# Patient Record
Sex: FEMALE | Race: WHITE | NOT HISPANIC OR LATINO | Employment: OTHER | URBAN - NONMETROPOLITAN AREA
[De-identification: names, ages, dates, MRNs, and addresses within clinical notes are randomized per-mention and may not be internally consistent; named-entity substitution may affect disease eponyms.]

---

## 2023-03-08 ENCOUNTER — TELEPHONE (OUTPATIENT)
Dept: PRIMARY CARE | Facility: CLINIC | Age: 78
End: 2023-03-08

## 2023-03-08 DIAGNOSIS — F41.9 ANXIETY: Primary | ICD-10-CM

## 2023-03-08 RX ORDER — ALPRAZOLAM 0.25 MG/1
TABLET ORAL
Qty: 60 TABLET | Refills: 2 | Status: SHIPPED | OUTPATIENT
Start: 2023-03-08 | End: 2023-09-15

## 2023-03-08 NOTE — TELEPHONE ENCOUNTER
Pt requesting a refill of Alprazolam 0.25 mg 1 qd. Pt called last week for the refill and because she requested the refill be trans to another pharm the refills were canceled. Pt said in Jan it had 2 refills but [facility] now needs a new rx sent.

## 2023-03-08 NOTE — TELEPHONE ENCOUNTER
"Rx Refill Request Telephone Encounter    Name:  Lorri Velasquez  :  [id number]  Medication Name:  ***  {Dose (Optional):05990::\"***\"}  {Route (Optional):22556::\"***\"}  {Frequency (Optional):94153::\"***\"}  {Quantity (Optional):71183::\"***\"}  {Directions (Optional):95744::\"***\"}  Specific Pharmacy location:  ***  Date of last appointment:  ***  Date of next appointment:  ***  Best number to reach patient:  ***            "

## 2023-03-18 DIAGNOSIS — I10 BENIGN ESSENTIAL HYPERTENSION: ICD-10-CM

## 2023-03-20 PROBLEM — I10 BENIGN ESSENTIAL HYPERTENSION: Status: ACTIVE | Noted: 2023-03-20

## 2023-03-20 RX ORDER — SPIRONOLACTONE 25 MG/1
TABLET ORAL
Qty: 90 TABLET | Refills: 0 | Status: SHIPPED | OUTPATIENT
Start: 2023-03-20 | End: 2023-08-16 | Stop reason: SDUPTHER

## 2023-03-20 RX ORDER — TERAZOSIN 1 MG/1
1 CAPSULE ORAL NIGHTLY
COMMUNITY
End: 2023-03-20 | Stop reason: SDUPTHER

## 2023-03-20 RX ORDER — SPIRONOLACTONE 25 MG/1
25 TABLET ORAL DAILY
COMMUNITY
End: 2023-03-20 | Stop reason: SDUPTHER

## 2023-03-20 RX ORDER — TERAZOSIN 1 MG/1
CAPSULE ORAL
Qty: 90 CAPSULE | Refills: 0 | Status: SHIPPED | OUTPATIENT
Start: 2023-03-20 | End: 2023-07-16

## 2023-04-03 DIAGNOSIS — E78.2 MIXED HYPERLIPIDEMIA: Primary | ICD-10-CM

## 2023-04-03 RX ORDER — ATORVASTATIN CALCIUM 40 MG/1
40 TABLET, FILM COATED ORAL
COMMUNITY
End: 2023-04-03 | Stop reason: SDUPTHER

## 2023-04-03 RX ORDER — ATORVASTATIN CALCIUM 40 MG/1
TABLET, FILM COATED ORAL
Qty: 90 TABLET | Refills: 3 | Status: SHIPPED | OUTPATIENT
Start: 2023-04-03

## 2023-04-13 ENCOUNTER — TELEPHONE (OUTPATIENT)
Dept: PRIMARY CARE | Facility: CLINIC | Age: 78
End: 2023-04-13
Payer: MEDICARE

## 2023-04-13 NOTE — TELEPHONE ENCOUNTER
PT said she fell on her back about 6 wks ago and is now experiencing excruciating pain in the middle of her back on left side, when she takes a deep breath it hurts.  She did not go to the ER at the time of fall.  She is asking to get an x-ray.

## 2023-04-14 DIAGNOSIS — R07.81 RIB PAIN ON LEFT SIDE: Primary | ICD-10-CM

## 2023-04-14 DIAGNOSIS — M54.50 ACUTE LEFT-SIDED LOW BACK PAIN WITHOUT SCIATICA: ICD-10-CM

## 2023-04-14 DIAGNOSIS — M54.6 ACUTE LEFT-SIDED THORACIC BACK PAIN: ICD-10-CM

## 2023-04-14 NOTE — TELEPHONE ENCOUNTER
Would place an order for a mid and low back x-ray, as well as a left rib x-ray series.  She will probably want to get at a [facility] facility closer to her.  If so, I will need to print out the orders, let me know please

## 2023-04-28 PROBLEM — R42 LIGHTHEADEDNESS: Status: ACTIVE | Noted: 2019-03-25

## 2023-04-28 PROBLEM — E11.9 TYPE 2 DIABETES MELLITUS (MULTI): Chronic | Status: ACTIVE | Noted: 2019-03-25

## 2023-04-28 PROBLEM — E28.39 ESTROGEN DEFICIENCY: Status: ACTIVE | Noted: 2023-04-28

## 2023-04-28 PROBLEM — E03.9 HYPOTHYROIDISM: Status: ACTIVE | Noted: 2023-04-28

## 2023-04-28 PROBLEM — E78.5 HYPERLIPIDEMIA: Status: ACTIVE | Noted: 2023-04-28

## 2023-04-28 PROBLEM — M76.60 CALCIFIC ACHILLES TENDINITIS: Status: ACTIVE | Noted: 2023-04-28

## 2023-04-28 PROBLEM — N18.30 CKD (CHRONIC KIDNEY DISEASE) STAGE 3, GFR 30-59 ML/MIN (MULTI): Chronic | Status: ACTIVE | Noted: 2019-03-25

## 2023-04-28 PROBLEM — I10 HYPERTENSION: Status: ACTIVE | Noted: 2019-03-25

## 2023-04-28 PROBLEM — M79.81 NONTRAUMATIC RECTUS HEMATOMA: Status: ACTIVE | Noted: 2019-03-25

## 2023-04-28 PROBLEM — M54.2 CERVICAL PAIN (NECK): Status: ACTIVE | Noted: 2019-03-26

## 2023-04-28 PROBLEM — S30.1XXA HEMATOMA OF RECTUS SHEATH: Status: ACTIVE | Noted: 2019-08-14

## 2023-04-28 PROBLEM — F41.1 GENERALIZED ANXIETY DISORDER: Status: ACTIVE | Noted: 2023-04-28

## 2023-04-28 PROBLEM — R06.09 DYSPNEA ON EXERTION: Status: ACTIVE | Noted: 2023-04-28

## 2023-04-28 PROBLEM — S06.0X1A CONCUSSION WTH LOSS OF CONSCIOUSNESS OF 30 MINUTES OR LESS: Status: ACTIVE | Noted: 2019-08-15

## 2023-04-28 PROBLEM — J01.90 ACUTE SINUSITIS: Status: ACTIVE | Noted: 2023-04-28

## 2023-04-28 PROBLEM — D64.9 ANEMIA: Status: ACTIVE | Noted: 2019-12-25

## 2023-04-28 PROBLEM — G31.84 MILD COGNITIVE IMPAIRMENT: Status: ACTIVE | Noted: 2023-04-28

## 2023-04-28 PROBLEM — F41.9 ANXIETY: Status: ACTIVE | Noted: 2023-04-28

## 2023-04-28 PROBLEM — R53.81 DEBILITY: Status: ACTIVE | Noted: 2019-12-25

## 2023-04-28 PROBLEM — G25.0 TREMOR, ESSENTIAL: Status: ACTIVE | Noted: 2023-04-28

## 2023-04-28 PROBLEM — I49.5 SICK SINUS SYNDROME (MULTI): Status: ACTIVE | Noted: 2018-01-31

## 2023-04-28 PROBLEM — E78.00 HIGH CHOLESTEROL: Status: ACTIVE | Noted: 2023-04-28

## 2023-04-28 PROBLEM — M62.08 RECTUS DIASTASIS: Status: ACTIVE | Noted: 2023-04-28

## 2023-04-28 PROBLEM — S92.352A CLOSED DISPLACED FRACTURE OF FIFTH METATARSAL BONE OF LEFT FOOT: Status: ACTIVE | Noted: 2023-04-28

## 2023-04-28 PROBLEM — I48.91 ATRIAL FIBRILLATION (MULTI): Status: ACTIVE | Noted: 2019-03-25

## 2023-04-28 PROBLEM — K21.9 GERD (GASTROESOPHAGEAL REFLUX DISEASE): Status: ACTIVE | Noted: 2023-04-28

## 2023-04-28 PROBLEM — I25.10 ARTERIOSCLEROSIS OF CORONARY ARTERY: Status: ACTIVE | Noted: 2019-03-25

## 2023-04-28 PROBLEM — J20.9 ACUTE BRONCHITIS: Status: ACTIVE | Noted: 2023-04-28

## 2023-04-28 PROBLEM — M65.28 CALCIFIC ACHILLES TENDINITIS: Status: ACTIVE | Noted: 2023-04-28

## 2023-04-28 PROBLEM — M79.671 PAIN OF RIGHT HEEL: Status: ACTIVE | Noted: 2023-04-28

## 2023-04-28 PROBLEM — D69.6 THROMBOCYTOPENIA (CMS-HCC): Status: ACTIVE | Noted: 2023-04-28

## 2023-04-28 PROBLEM — M25.572 ACUTE LEFT ANKLE PAIN: Status: ACTIVE | Noted: 2023-04-28

## 2023-04-28 PROBLEM — M19.90 ARTHRITIS: Status: ACTIVE | Noted: 2023-04-28

## 2023-04-28 PROBLEM — K80.20 GALL STONES: Status: ACTIVE | Noted: 2023-04-28

## 2023-04-28 PROBLEM — E11.21 DIABETIC NEPHROPATHY (MULTI): Status: ACTIVE | Noted: 2023-04-28

## 2023-04-28 PROBLEM — F32.A DEPRESSION: Status: ACTIVE | Noted: 2023-04-28

## 2023-04-28 PROBLEM — N17.9 AKI (ACUTE KIDNEY INJURY) (CMS-HCC): Status: ACTIVE | Noted: 2019-12-25

## 2023-04-28 PROBLEM — N28.9 FUNCTION KIDNEY DECREASED: Status: ACTIVE | Noted: 2023-04-28

## 2023-04-28 PROBLEM — I95.9 HYPOTENSION: Status: ACTIVE | Noted: 2019-12-24

## 2023-04-28 PROBLEM — S09.90XA CLOSED HEAD INJURY: Status: ACTIVE | Noted: 2019-08-24

## 2023-04-28 PROBLEM — G89.29 CHRONIC THORACIC SPINE PAIN: Status: ACTIVE | Noted: 2023-04-28

## 2023-04-28 PROBLEM — M54.6 CHRONIC THORACIC SPINE PAIN: Status: ACTIVE | Noted: 2023-04-28

## 2023-05-31 ENCOUNTER — TELEPHONE (OUTPATIENT)
Dept: PRIMARY CARE | Facility: CLINIC | Age: 78
End: 2023-05-31

## 2023-05-31 NOTE — TELEPHONE ENCOUNTER
Pt calling having congestion, some color, coughing, sneezing. Started on Sunday. No fever or sob. Poss to call in rx? I offered her an apt and said that she can not come in that she just lost her spouse Mark yesterday or the day before.   Poss rx?

## 2023-06-01 DIAGNOSIS — J40 BRONCHITIS: Primary | ICD-10-CM

## 2023-06-01 RX ORDER — AZITHROMYCIN 250 MG/1
TABLET, FILM COATED ORAL
Qty: 6 TABLET | Refills: 0 | Status: SHIPPED | OUTPATIENT
Start: 2023-06-01 | End: 2023-06-09 | Stop reason: ALTCHOICE

## 2023-06-01 RX ORDER — ALBUTEROL SULFATE 90 UG/1
2 AEROSOL, METERED RESPIRATORY (INHALATION) EVERY 4 HOURS PRN
Qty: 8.5 G | Refills: 0 | Status: SHIPPED | OUTPATIENT
Start: 2023-06-01 | End: 2023-09-27 | Stop reason: ALTCHOICE

## 2023-06-01 NOTE — TELEPHONE ENCOUNTER
The patient called back and asked about medications. She was VERY difficult to understand.  Her cough was wet and productive sounding, she sounded weak and as if it were difficult to talk.  I went and spoke to Dr. Ghosh.  He said he would send in an antibiotic and inhaler.  He stressed that if the patient did not feel well that she needed to go to urgent care to be seen instead.    I explained this all to the patient. She said given the circumstances that she will not go to urgent care.  I explained I was passing the message along, and I understood. I also expressed that while she needed to be at the  home, she should also take care of herself at this time.  The patient will  meds at this time.

## 2023-06-09 ENCOUNTER — LAB (OUTPATIENT)
Dept: LAB | Facility: LAB | Age: 78
End: 2023-06-09
Payer: MEDICARE

## 2023-06-09 ENCOUNTER — OFFICE VISIT (OUTPATIENT)
Dept: PRIMARY CARE | Facility: CLINIC | Age: 78
End: 2023-06-09
Payer: MEDICARE

## 2023-06-09 VITALS
DIASTOLIC BLOOD PRESSURE: 76 MMHG | HEIGHT: 58 IN | SYSTOLIC BLOOD PRESSURE: 136 MMHG | HEART RATE: 62 BPM | OXYGEN SATURATION: 95 % | BODY MASS INDEX: 26.32 KG/M2 | WEIGHT: 125.4 LBS | TEMPERATURE: 97.5 F | RESPIRATION RATE: 14 BRPM

## 2023-06-09 DIAGNOSIS — E11.21 DIABETIC NEPHROPATHY ASSOCIATED WITH TYPE 2 DIABETES MELLITUS (MULTI): ICD-10-CM

## 2023-06-09 DIAGNOSIS — I10 PRIMARY HYPERTENSION: ICD-10-CM

## 2023-06-09 DIAGNOSIS — I48.11 LONGSTANDING PERSISTENT ATRIAL FIBRILLATION (MULTI): ICD-10-CM

## 2023-06-09 DIAGNOSIS — I10 BENIGN ESSENTIAL HYPERTENSION: Primary | ICD-10-CM

## 2023-06-09 DIAGNOSIS — I10 BENIGN ESSENTIAL HYPERTENSION: ICD-10-CM

## 2023-06-09 DIAGNOSIS — F43.21 GRIEF REACTION: ICD-10-CM

## 2023-06-09 PROBLEM — R63.0 ANOREXIA: Status: ACTIVE | Noted: 2019-12-25

## 2023-06-09 PROCEDURE — 36415 COLL VENOUS BLD VENIPUNCTURE: CPT

## 2023-06-09 PROCEDURE — 3075F SYST BP GE 130 - 139MM HG: CPT | Performed by: FAMILY MEDICINE

## 2023-06-09 PROCEDURE — 84443 ASSAY THYROID STIM HORMONE: CPT

## 2023-06-09 PROCEDURE — 99215 OFFICE O/P EST HI 40 MIN: CPT | Performed by: FAMILY MEDICINE

## 2023-06-09 PROCEDURE — 83036 HEMOGLOBIN GLYCOSYLATED A1C: CPT

## 2023-06-09 PROCEDURE — 3078F DIAST BP <80 MM HG: CPT | Performed by: FAMILY MEDICINE

## 2023-06-09 PROCEDURE — 84439 ASSAY OF FREE THYROXINE: CPT

## 2023-06-09 PROCEDURE — 1159F MED LIST DOCD IN RCRD: CPT | Performed by: FAMILY MEDICINE

## 2023-06-09 PROCEDURE — 80061 LIPID PANEL: CPT

## 2023-06-09 PROCEDURE — 85025 COMPLETE CBC W/AUTO DIFF WBC: CPT

## 2023-06-09 PROCEDURE — 80053 COMPREHEN METABOLIC PANEL: CPT

## 2023-06-09 PROCEDURE — 1170F FXNL STATUS ASSESSED: CPT | Performed by: FAMILY MEDICINE

## 2023-06-09 PROCEDURE — 1036F TOBACCO NON-USER: CPT | Performed by: FAMILY MEDICINE

## 2023-06-09 ASSESSMENT — ACTIVITIES OF DAILY LIVING (ADL)
GROCERY_SHOPPING: INDEPENDENT
TAKING_MEDICATION: INDEPENDENT
MANAGING_FINANCES: NEEDS ASSISTANCE
DRESSING: INDEPENDENT
BATHING: INDEPENDENT
DOING_HOUSEWORK: NEEDS ASSISTANCE

## 2023-06-09 ASSESSMENT — PATIENT HEALTH QUESTIONNAIRE - PHQ9
10. IF YOU CHECKED OFF ANY PROBLEMS, HOW DIFFICULT HAVE THESE PROBLEMS MADE IT FOR YOU TO DO YOUR WORK, TAKE CARE OF THINGS AT HOME, OR GET ALONG WITH OTHER PEOPLE: VERY DIFFICULT
2. FEELING DOWN, DEPRESSED OR HOPELESS: NEARLY EVERY DAY
3. TROUBLE FALLING OR STAYING ASLEEP OR SLEEPING TOO MUCH: NEARLY EVERY DAY
9. THOUGHTS THAT YOU WOULD BE BETTER OFF DEAD, OR OF HURTING YOURSELF: NOT AT ALL
4. FEELING TIRED OR HAVING LITTLE ENERGY: SEVERAL DAYS
1. LITTLE INTEREST OR PLEASURE IN DOING THINGS: NEARLY EVERY DAY
7. TROUBLE CONCENTRATING ON THINGS, SUCH AS READING THE NEWSPAPER OR WATCHING TELEVISION: SEVERAL DAYS
6. FEELING BAD ABOUT YOURSELF - OR THAT YOU ARE A FAILURE OR HAVE LET YOURSELF OR YOUR FAMILY DOWN: NOT AT ALL
5. POOR APPETITE OR OVEREATING: SEVERAL DAYS
SUM OF ALL RESPONSES TO PHQ QUESTIONS 1-9: 12
8. MOVING OR SPEAKING SO SLOWLY THAT OTHER PEOPLE COULD HAVE NOTICED. OR THE OPPOSITE, BEING SO FIGETY OR RESTLESS THAT YOU HAVE BEEN MOVING AROUND A LOT MORE THAN USUAL: NOT AT ALL
SUM OF ALL RESPONSES TO PHQ9 QUESTIONS 1 AND 2: 6

## 2023-06-09 ASSESSMENT — PAIN SCALES - GENERAL: PAINLEVEL: 0-NO PAIN

## 2023-06-09 ASSESSMENT — ENCOUNTER SYMPTOMS
DEPRESSION: 1
LOSS OF SENSATION IN FEET: 0
OCCASIONAL FEELINGS OF UNSTEADINESS: 1

## 2023-06-09 NOTE — PROGRESS NOTES
Subjective   Reason for Visit: Lorri Velasquez is an 78 y.o. female here for a Medicare Wellness visit.          Reviewed all medications by prescribing practitioner or clinical pharmacist (such as prescriptions, OTCs, herbal therapies and supplements) and documented in the medical record.    HPI    Patient Care Team:  Bayron Ghosh MD as PCP - General  Bayron Ghosh MD as PCP - [facility] PCP     Review of Systems    Objective   Vitals:  Breastfeeding Unknown       Physical Exam    Assessment/Plan   Problem List Items Addressed This Visit    None

## 2023-06-09 NOTE — PROGRESS NOTES
"Subjective   Patient ID: Lorri Velasquez is a 78 y.o. female who presents for Medicare Annual Wellness Visit Subsequent and Fall (Tripping over her own feet head feels funny turns around and falls).    [facility]   Isabel was seen today for a routine follow-up of her medications, including those for hypertension, impaired glucose tolerance, hyperlipidemia, and others.  She is fasting for blood work today.  Unfortunately, her spouse Mark just  about a week ago, from severe Parkinson's disease, also coronary artery disease and CHF.  He  at home, peacefully.  She has had some difficulty sleeping, feels a bit \"numb\".  She does live with her son Casey, who is supportive.  She is now on Eliquis 2.5 mg just once daily, per cardiology, for her atrial fibrillation.  She is concerned because she has stumbled, fell recently tripping on a step at a local park.  Mysoline, which was started for an essential tremor, does seem to make her drowsy.  She takes a low-dose, just as needed.  She is considering stopping it altogether.  She is also taking alprazolam, generally 1 tablet daily for many years.   Previous labs from early February of this year reviewed  Review of Systems  The full, 10+ multi-organ review of systems, is within normal limits with the exception of what is noted above in HPI.  Objective   /76 (BP Location: Right arm, Patient Position: Sitting, BP Cuff Size: Adult)   Pulse 62   Temp 36.4 °C (97.5 °F) (Temporal)   Resp 14   Ht 1.473 m (4' 10\")   Wt 56.9 kg (125 lb 6.4 oz)   SpO2 95%   Breastfeeding Unknown   BMI 26.21 kg/m²     Physical Exam  .  Cardiac exam reveals an irregularly irregular rhythm, regular rate, murmur noted.  Lungs are clear, no lower extremity edema present  Constitutional/General appearance: alert, oriented, well-appearing, in no distress  Head and face exam is normal  No scleral icterus or conjunctival erythema present  Hearing is grossly normal  Respiratory effort is normal, " no dyspnea noted  Cortical function is normal  Mood, affect, are pleasant, appropriate, and interactive.  Insight is normal    Assessment/Plan     Atrial fibrillation, low-dose anticoagulation, rate controlled, managed by cardiology.    She will continue all medications for now, though we discussed minimal lysing Mysoline, and weaning off of alprazolam, beginning after her 's  service in a couple of weeks.    Regarding her thyroid, cholesterol, blood pressure, she will continue all routine medications, fasting labs will be checked today.    I reviewed, discussed in detail, counseled a bit regarding the loss of her  Mark.  She will call if she needs anything.      Total time spent with patient, reviewing records, and completing charting was 45 minutes, over half of it spent counseling and/or coordinating care  **Portions of this medical record have been created using voice recognition software and may have minor errors which are inherent in voice recognition systems. It has not been fully edited for typographical or grammatical errors**

## 2023-06-10 LAB
ALANINE AMINOTRANSFERASE (SGPT) (U/L) IN SER/PLAS: 21 U/L (ref 7–45)
ALBUMIN (G/DL) IN SER/PLAS: 4.1 G/DL (ref 3.4–5)
ALKALINE PHOSPHATASE (U/L) IN SER/PLAS: 72 U/L (ref 33–136)
ANION GAP IN SER/PLAS: 16 MMOL/L (ref 10–20)
ASPARTATE AMINOTRANSFERASE (SGOT) (U/L) IN SER/PLAS: 29 U/L (ref 9–39)
BASOPHILS (10*3/UL) IN BLOOD BY AUTOMATED COUNT: 0.06 X10E9/L (ref 0–0.1)
BASOPHILS/100 LEUKOCYTES IN BLOOD BY AUTOMATED COUNT: 0.7 % (ref 0–2)
BILIRUBIN TOTAL (MG/DL) IN SER/PLAS: 0.7 MG/DL (ref 0–1.2)
CALCIUM (MG/DL) IN SER/PLAS: 9.6 MG/DL (ref 8.6–10.6)
CARBON DIOXIDE, TOTAL (MMOL/L) IN SER/PLAS: 25 MMOL/L (ref 21–32)
CHLORIDE (MMOL/L) IN SER/PLAS: 102 MMOL/L (ref 98–107)
CHOLESTEROL (MG/DL) IN SER/PLAS: 86 MG/DL (ref 0–199)
CHOLESTEROL IN HDL (MG/DL) IN SER/PLAS: 31 MG/DL
CHOLESTEROL/HDL RATIO: 2.8
CREATININE (MG/DL) IN SER/PLAS: 2 MG/DL (ref 0.5–1.05)
EOSINOPHILS (10*3/UL) IN BLOOD BY AUTOMATED COUNT: 0.09 X10E9/L (ref 0–0.4)
EOSINOPHILS/100 LEUKOCYTES IN BLOOD BY AUTOMATED COUNT: 1.1 % (ref 0–6)
ERYTHROCYTE DISTRIBUTION WIDTH (RATIO) BY AUTOMATED COUNT: 14.8 % (ref 11.5–14.5)
ERYTHROCYTE MEAN CORPUSCULAR HEMOGLOBIN CONCENTRATION (G/DL) BY AUTOMATED: 30.8 G/DL (ref 32–36)
ERYTHROCYTE MEAN CORPUSCULAR VOLUME (FL) BY AUTOMATED COUNT: 93 FL (ref 80–100)
ERYTHROCYTES (10*6/UL) IN BLOOD BY AUTOMATED COUNT: 4.45 X10E12/L (ref 4–5.2)
ESTIMATED AVERAGE GLUCOSE FOR HBA1C: 166 MG/DL
GFR FEMALE: 25 ML/MIN/1.73M2
GLUCOSE (MG/DL) IN SER/PLAS: 161 MG/DL (ref 74–99)
HEMATOCRIT (%) IN BLOOD BY AUTOMATED COUNT: 41.3 % (ref 36–46)
HEMOGLOBIN (G/DL) IN BLOOD: 12.7 G/DL (ref 12–16)
HEMOGLOBIN A1C/HEMOGLOBIN TOTAL IN BLOOD: 7.4 %
IMMATURE GRANULOCYTES/100 LEUKOCYTES IN BLOOD BY AUTOMATED COUNT: 1.1 % (ref 0–0.9)
LDL: 33 MG/DL (ref 0–99)
LEUKOCYTES (10*3/UL) IN BLOOD BY AUTOMATED COUNT: 8.2 X10E9/L (ref 4.4–11.3)
LYMPHOCYTES (10*3/UL) IN BLOOD BY AUTOMATED COUNT: 1.23 X10E9/L (ref 0.8–3)
LYMPHOCYTES/100 LEUKOCYTES IN BLOOD BY AUTOMATED COUNT: 15 % (ref 13–44)
MONOCYTES (10*3/UL) IN BLOOD BY AUTOMATED COUNT: 0.68 X10E9/L (ref 0.05–0.8)
MONOCYTES/100 LEUKOCYTES IN BLOOD BY AUTOMATED COUNT: 8.3 % (ref 2–10)
NEUTROPHILS (10*3/UL) IN BLOOD BY AUTOMATED COUNT: 6.05 X10E9/L (ref 1.6–5.5)
NEUTROPHILS/100 LEUKOCYTES IN BLOOD BY AUTOMATED COUNT: 73.8 % (ref 40–80)
NRBC (PER 100 WBCS) BY AUTOMATED COUNT: 0 /100 WBC (ref 0–0)
PLATELETS (10*3/UL) IN BLOOD AUTOMATED COUNT: 241 X10E9/L (ref 150–450)
POTASSIUM (MMOL/L) IN SER/PLAS: 4.9 MMOL/L (ref 3.5–5.3)
PROTEIN TOTAL: 7.1 G/DL (ref 6.4–8.2)
SODIUM (MMOL/L) IN SER/PLAS: 138 MMOL/L (ref 136–145)
THYROTROPIN (MIU/L) IN SER/PLAS BY DETECTION LIMIT <= 0.05 MIU/L: 0.25 MIU/L (ref 0.44–3.98)
THYROXINE (T4) FREE (NG/DL) IN SER/PLAS: 1.9 NG/DL (ref 0.78–1.48)
TRIGLYCERIDE (MG/DL) IN SER/PLAS: 109 MG/DL (ref 0–149)
UREA NITROGEN (MG/DL) IN SER/PLAS: 30 MG/DL (ref 6–23)
VLDL: 22 MG/DL (ref 0–40)

## 2023-06-15 DIAGNOSIS — E03.9 ACQUIRED HYPOTHYROIDISM: Primary | ICD-10-CM

## 2023-06-15 DIAGNOSIS — N18.31 STAGE 3A CHRONIC KIDNEY DISEASE (MULTI): Chronic | ICD-10-CM

## 2023-06-15 RX ORDER — LEVOTHYROXINE SODIUM 100 UG/1
100 TABLET ORAL DAILY
Qty: 90 TABLET | Refills: 1 | Status: SHIPPED | OUTPATIENT
Start: 2023-06-15 | End: 2023-08-16 | Stop reason: SDUPTHER

## 2023-06-15 NOTE — RESULT ENCOUNTER NOTE
Labs are overall stable, incl chol, sugar.  Kidney function is a little worse, be sure to drink 64 oz of water daily  Thyroid dose needs decreased a little, to 100 mcg daily, new Rx sent  Recheck thyroid and kidney labs in 6 months

## 2023-07-14 DIAGNOSIS — I10 BENIGN ESSENTIAL HYPERTENSION: ICD-10-CM

## 2023-07-16 RX ORDER — TERAZOSIN 1 MG/1
CAPSULE ORAL
Qty: 90 CAPSULE | Refills: 3 | Status: SHIPPED | OUTPATIENT
Start: 2023-07-16

## 2023-07-23 DIAGNOSIS — F34.1 PERSISTENT DEPRESSIVE DISORDER: Primary | ICD-10-CM

## 2023-07-23 DIAGNOSIS — F41.9 ANXIETY: ICD-10-CM

## 2023-07-24 RX ORDER — ESCITALOPRAM OXALATE 20 MG/1
20 TABLET ORAL DAILY
Qty: 90 TABLET | Refills: 3 | Status: SHIPPED | OUTPATIENT
Start: 2023-07-24 | End: 2023-08-16 | Stop reason: SDUPTHER

## 2023-08-16 ENCOUNTER — TELEPHONE (OUTPATIENT)
Dept: PRIMARY CARE | Facility: CLINIC | Age: 78
End: 2023-08-16

## 2023-08-16 DIAGNOSIS — F34.1 PERSISTENT DEPRESSIVE DISORDER: ICD-10-CM

## 2023-08-16 DIAGNOSIS — K21.00 GASTROESOPHAGEAL REFLUX DISEASE WITH ESOPHAGITIS WITHOUT HEMORRHAGE: ICD-10-CM

## 2023-08-16 DIAGNOSIS — E03.9 ACQUIRED HYPOTHYROIDISM: ICD-10-CM

## 2023-08-16 DIAGNOSIS — I10 BENIGN ESSENTIAL HYPERTENSION: ICD-10-CM

## 2023-08-16 DIAGNOSIS — E11.00 TYPE 2 DIABETES MELLITUS WITH HYPEROSMOLARITY WITHOUT COMA, WITHOUT LONG-TERM CURRENT USE OF INSULIN (MULTI): Chronic | ICD-10-CM

## 2023-08-16 DIAGNOSIS — F41.9 ANXIETY: ICD-10-CM

## 2023-08-16 PROBLEM — I48.4 ATYPICAL ATRIAL FLUTTER (MULTI): Status: ACTIVE | Noted: 2023-08-16

## 2023-08-16 RX ORDER — ESCITALOPRAM OXALATE 20 MG/1
20 TABLET ORAL DAILY
Qty: 90 TABLET | Refills: 3 | Status: SHIPPED | OUTPATIENT
Start: 2023-08-16

## 2023-08-16 RX ORDER — SPIRONOLACTONE 25 MG/1
25 TABLET ORAL DAILY
Qty: 90 TABLET | Refills: 3 | Status: SHIPPED | OUTPATIENT
Start: 2023-08-16

## 2023-08-16 RX ORDER — LEVOTHYROXINE SODIUM 100 UG/1
100 TABLET ORAL DAILY
Qty: 90 TABLET | Refills: 3 | Status: SHIPPED | OUTPATIENT
Start: 2023-08-16 | End: 2023-09-28 | Stop reason: ALTCHOICE

## 2023-08-16 RX ORDER — PANTOPRAZOLE SODIUM 40 MG/1
40 TABLET, DELAYED RELEASE ORAL DAILY
Qty: 90 TABLET | Refills: 3 | Status: SHIPPED | OUTPATIENT
Start: 2023-08-16

## 2023-08-16 RX ORDER — GLIMEPIRIDE 1 MG/1
1 TABLET ORAL DAILY
Qty: 90 TABLET | Refills: 3 | Status: SHIPPED | OUTPATIENT
Start: 2023-08-16

## 2023-08-16 NOTE — TELEPHONE ENCOUNTER
The patient is asking Dr. Ghosh's nurse to call her in regards to her medications and questions she about those.

## 2023-08-16 NOTE — TELEPHONE ENCOUNTER
Spoke to Lorri she is concerned about her medications and confused what she is supposed to be taking. Went over all her meds and found out which ones she needs refills on please send to her local pharmacy.

## 2023-08-17 NOTE — TELEPHONE ENCOUNTER
Spoke with the patient this morning and provided information. She did not have any further questions

## 2023-09-15 DIAGNOSIS — F41.9 ANXIETY: ICD-10-CM

## 2023-09-15 RX ORDER — ALPRAZOLAM 0.25 MG/1
TABLET ORAL
Qty: 60 TABLET | Refills: 0 | Status: SHIPPED | OUTPATIENT
Start: 2023-09-15 | End: 2023-09-27

## 2023-09-27 ENCOUNTER — LAB (OUTPATIENT)
Dept: LAB | Facility: LAB | Age: 78
End: 2023-09-27
Payer: MEDICARE

## 2023-09-27 ENCOUNTER — OFFICE VISIT (OUTPATIENT)
Dept: PRIMARY CARE | Facility: CLINIC | Age: 78
End: 2023-09-27
Payer: MEDICARE

## 2023-09-27 VITALS
TEMPERATURE: 97.5 F | HEART RATE: 67 BPM | WEIGHT: 131.8 LBS | SYSTOLIC BLOOD PRESSURE: 139 MMHG | RESPIRATION RATE: 16 BRPM | OXYGEN SATURATION: 95 % | DIASTOLIC BLOOD PRESSURE: 64 MMHG | BODY MASS INDEX: 27.55 KG/M2

## 2023-09-27 DIAGNOSIS — F41.9 ANXIETY: ICD-10-CM

## 2023-09-27 DIAGNOSIS — E03.9 ACQUIRED HYPOTHYROIDISM: ICD-10-CM

## 2023-09-27 DIAGNOSIS — E11.00 TYPE 2 DIABETES MELLITUS WITH HYPEROSMOLARITY WITHOUT COMA, WITHOUT LONG-TERM CURRENT USE OF INSULIN (MULTI): ICD-10-CM

## 2023-09-27 DIAGNOSIS — E03.9 ACQUIRED HYPOTHYROIDISM: Primary | ICD-10-CM

## 2023-09-27 DIAGNOSIS — I48.11 LONGSTANDING PERSISTENT ATRIAL FIBRILLATION (MULTI): ICD-10-CM

## 2023-09-27 DIAGNOSIS — E11.21 DIABETIC NEPHROPATHY ASSOCIATED WITH TYPE 2 DIABETES MELLITUS (MULTI): ICD-10-CM

## 2023-09-27 DIAGNOSIS — I10 PRIMARY HYPERTENSION: ICD-10-CM

## 2023-09-27 PROCEDURE — 3075F SYST BP GE 130 - 139MM HG: CPT | Performed by: FAMILY MEDICINE

## 2023-09-27 PROCEDURE — 84443 ASSAY THYROID STIM HORMONE: CPT

## 2023-09-27 PROCEDURE — 1160F RVW MEDS BY RX/DR IN RCRD: CPT | Performed by: FAMILY MEDICINE

## 2023-09-27 PROCEDURE — G0008 ADMIN INFLUENZA VIRUS VAC: HCPCS | Performed by: FAMILY MEDICINE

## 2023-09-27 PROCEDURE — 1126F AMNT PAIN NOTED NONE PRSNT: CPT | Performed by: FAMILY MEDICINE

## 2023-09-27 PROCEDURE — 84439 ASSAY OF FREE THYROXINE: CPT

## 2023-09-27 PROCEDURE — 1036F TOBACCO NON-USER: CPT | Performed by: FAMILY MEDICINE

## 2023-09-27 PROCEDURE — 1159F MED LIST DOCD IN RCRD: CPT | Performed by: FAMILY MEDICINE

## 2023-09-27 PROCEDURE — 99215 OFFICE O/P EST HI 40 MIN: CPT | Performed by: FAMILY MEDICINE

## 2023-09-27 PROCEDURE — 3078F DIAST BP <80 MM HG: CPT | Performed by: FAMILY MEDICINE

## 2023-09-27 PROCEDURE — 90662 IIV NO PRSV INCREASED AG IM: CPT | Performed by: FAMILY MEDICINE

## 2023-09-27 PROCEDURE — 36415 COLL VENOUS BLD VENIPUNCTURE: CPT

## 2023-09-27 RX ORDER — BUSPIRONE HYDROCHLORIDE 5 MG/1
5 TABLET ORAL 2 TIMES DAILY
Qty: 60 TABLET | Refills: 5 | Status: SHIPPED | OUTPATIENT
Start: 2023-09-27 | End: 2023-10-20 | Stop reason: SINTOL

## 2023-09-27 RX ORDER — ALPRAZOLAM 0.5 MG/1
0.5 TABLET ORAL 3 TIMES DAILY PRN
Qty: 30 TABLET | Refills: 0 | Status: SHIPPED | OUTPATIENT
Start: 2023-09-27 | End: 2023-11-08

## 2023-09-27 ASSESSMENT — ANXIETY QUESTIONNAIRES
GAD7 TOTAL SCORE: 18
6. BECOMING EASILY ANNOYED OR IRRITABLE: NEARLY EVERY DAY
7. FEELING AFRAID AS IF SOMETHING AWFUL MIGHT HAPPEN: NOT AT ALL
3. WORRYING TOO MUCH ABOUT DIFFERENT THINGS: NEARLY EVERY DAY
1. FEELING NERVOUS, ANXIOUS, OR ON EDGE: NEARLY EVERY DAY
IF YOU CHECKED OFF ANY PROBLEMS ON THIS QUESTIONNAIRE, HOW DIFFICULT HAVE THESE PROBLEMS MADE IT FOR YOU TO DO YOUR WORK, TAKE CARE OF THINGS AT HOME, OR GET ALONG WITH OTHER PEOPLE: EXTREMELY DIFFICULT
5. BEING SO RESTLESS THAT IT IS HARD TO SIT STILL: NEARLY EVERY DAY
4. TROUBLE RELAXING: NEARLY EVERY DAY
2. NOT BEING ABLE TO STOP OR CONTROL WORRYING: NEARLY EVERY DAY

## 2023-09-27 ASSESSMENT — PATIENT HEALTH QUESTIONNAIRE - PHQ9
2. FEELING DOWN, DEPRESSED OR HOPELESS: NOT AT ALL
SUM OF ALL RESPONSES TO PHQ9 QUESTIONS 1 AND 2: 0
1. LITTLE INTEREST OR PLEASURE IN DOING THINGS: NOT AT ALL

## 2023-09-27 ASSESSMENT — PAIN SCALES - GENERAL: PAINLEVEL: 0-NO PAIN

## 2023-09-27 NOTE — PATIENT INSTRUCTIONS
Add Buspirone 5 mg twice daily every day  Continue lexapro (escitalopram) 20mg every day  TRY alprazolam (xanax) 0.5mg twice daily  Stop primidone  Eliquis (apixaban) 2.5 mg TWICE daily

## 2023-09-28 DIAGNOSIS — E03.9 ACQUIRED HYPOTHYROIDISM: Primary | ICD-10-CM

## 2023-09-28 LAB
THYROTROPIN (MIU/L) IN SER/PLAS BY DETECTION LIMIT <= 0.05 MIU/L: 0.37 MIU/L (ref 0.44–3.98)
THYROXINE (T4) FREE (NG/DL) IN SER/PLAS: 1.73 NG/DL (ref 0.78–1.48)

## 2023-09-28 RX ORDER — LEVOTHYROXINE SODIUM 88 UG/1
88 TABLET ORAL DAILY
Qty: 30 TABLET | Refills: 1 | Status: SHIPPED | OUTPATIENT
Start: 2023-09-28 | End: 2023-11-25

## 2023-09-28 NOTE — RESULT ENCOUNTER NOTE
Based on her labs, thyroid dose needs decreased a bit more.  I sent a prescription for 88 mcg to her [facility].  Take this daily on an empty stomach, need to recheck blood test before the second refill is finished, i.e. in about 7 weeks.  Order placed

## 2023-10-02 NOTE — PROGRESS NOTES
Subjective   Patient ID: Lorri Velasquez is a 78 y.o. female who presents for Hyperlipidemia, Hypertension, Hypothyroidism, Diabetes, Depression, and Shortness of Breath.    HPI   Ronna was seen today, son present, for a routine follow-up of her medications, including those for depression, anxiety, tremor, thyroid, cholesterol, atrial fibrillation.  She does not believe primidone has helped her tremor much, and she does note drowsiness from it.  Tremor is present much of the day, seems worse with anxiety.  At last TSH check, her levothyroxine dose was reduced, she is due for recheck today.  Her cardiology care is up-to-date.  She does note subjective dyspnea, often at rest, particularly if she is upset or anxious.  These labs were reviewed, she would like a flu vaccine.  Review of Systems  The full, 10+ multi-organ review of systems, is within normal limits with the exception of what is noted above in HPI.  Objective   /64 (BP Location: Right arm, Patient Position: Sitting, BP Cuff Size: Adult)   Pulse 67   Temp 36.4 °C (97.5 °F) (Temporal)   Resp 16   Wt 59.8 kg (131 lb 12.8 oz)   SpO2 95%   BMI 27.55 kg/m²     Physical Exam  Cardiac exam reveals an irregularly irregular rhythm, regular rate, murmur present.   Lungs are clear bilaterally.    No lower extremity edema present.  She is pleasant, appropriate, interactive, though anxious at times.  The more anxious she appears, the worse her upper extremity/hand tremors appear to be    Assessment/Plan     Hypertension, atrial fibrillation, diabetes, are all fairly well controlled.  Continue current medication    Tremor, which I now feel significantly correlates to her anxiety level.  We will stay off of primidone, continue full dose Lexapro, add buspirone 5 mg twice daily.  She has taken alprazolam fairly regularly for over 15 years, tolerates it well.  Risks, including cognitive impairment, drowsiness, slowed reflexes, sedation all again reviewed.   I recommend that at least in the short-term she take alprazolam twice daily, to see if tremor and anxiety will improve.  My hope is that if BuSpar helps (may need increased), that alprazolam can be decreased.  We will check on her in about a month, to review her progress.    Continue all other medications    Flu vaccine given  Follow-up as scheduled      Total time spent with patient, reviewing records, and completing charting was 40 minutes, over half of it spent counseling and/or coordinating care  **Portions of this medical record have been created using voice recognition software and may have minor errors which are inherent in voice recognition systems. It has not been fully edited for typographical or grammatical errors**

## 2023-10-16 ENCOUNTER — TELEPHONE (OUTPATIENT)
Dept: PRIMARY CARE | Facility: CLINIC | Age: 78
End: 2023-10-16

## 2023-10-16 NOTE — TELEPHONE ENCOUNTER
Patient called in to ask about the changes that were made,   She stated that she is also taking Xeralto and she is also taking the Eliquis.       She is taking half of the xanax in the morning.     Since the medication changes she is having worse anxiety and feeling worse, having a hard time walking a cross the room, lots shaking and shortness of breath.     She is not sure why xeralto was taken off the list

## 2023-10-16 NOTE — TELEPHONE ENCOUNTER
"Call \"Ronna\", STOP the xarelto, she should ONLY be on Eliquis.    Have her STOP the buspirone, call with an update on Friday.  Have her write down these instructions please  "

## 2023-10-17 NOTE — TELEPHONE ENCOUNTER
The patient was given information in detail. She wrote the information down and will call with an update on Friday.

## 2023-10-20 DIAGNOSIS — I48.11 LONGSTANDING PERSISTENT ATRIAL FIBRILLATION (MULTI): Primary | ICD-10-CM

## 2023-10-20 NOTE — TELEPHONE ENCOUNTER
Patient calling in with an update as instructed:    She did stop taking buspar and xarelto prescriptions    She feels a little better but is still feeling lightheaded, achey legs that feel heavy (feels better mid day), BP is 76/48 HR 62, when she walks even 8 feet she feels as though she may collapse     She is drinking lots of water also

## 2023-10-20 NOTE — TELEPHONE ENCOUNTER
Spoke to patient advised and understood. She will call Monday with an update on how she is feeling

## 2023-10-20 NOTE — TELEPHONE ENCOUNTER
Have her not take her amiodarone for 3 days in a row, call with an update.  If she continues to feel poorly, would recommend an emergency room visit for urgent labs, EKG, evaluation.

## 2023-10-24 ENCOUNTER — TELEPHONE (OUTPATIENT)
Dept: PRIMARY CARE | Facility: CLINIC | Age: 78
End: 2023-10-24

## 2023-10-24 DIAGNOSIS — I10 HYPERTENSION, UNSPECIFIED TYPE: ICD-10-CM

## 2023-10-24 RX ORDER — AMIODARONE HYDROCHLORIDE 100 MG/1
100 TABLET ORAL DAILY
Qty: 90 TABLET | Refills: 3 | Status: SHIPPED | OUTPATIENT
Start: 2023-10-24 | End: 2024-10-23

## 2023-10-24 NOTE — TELEPHONE ENCOUNTER
Patient calling with update since amiodarone was discontinued Friday-yesterday    She feels a lot better since she has not taken this, she is still out of breath but not as bad, dizziness is much better.    Is there something else she can try for afib?

## 2023-10-24 NOTE — TELEPHONE ENCOUNTER
Lets try a lower dose for now, 100 mg daily instead of 200 mg amiodarone.  If side effects increase, would recommend she follow-up with cardiology for additional advice

## 2023-10-25 ENCOUNTER — TELEPHONE (OUTPATIENT)
Dept: PRIMARY CARE | Facility: CLINIC | Age: 78
End: 2023-10-25

## 2023-10-25 RX ORDER — HYDROCHLOROTHIAZIDE 12.5 MG/1
12.5 TABLET ORAL DAILY
Qty: 90 TABLET | Refills: 3 | Status: SHIPPED | OUTPATIENT
Start: 2023-10-25

## 2023-10-25 NOTE — TELEPHONE ENCOUNTER
Yes, sorry, here's where the confusion came from:    This message was a reminder that I placed several weeks ago to check on her to see how her buspirone was working.  We happened to have stopped it shortly after starting it because of side effects, the reminder still popped up planned though.    She should not be taking it, continue medications as was reviewed yesterday.

## 2023-10-25 NOTE — TELEPHONE ENCOUNTER
Left message for patient to call back to discuss     ----- Message from Bayron Ghosh MD sent at 10/2/2023 12:28 PM EDT -----  3 to 4-week reminder, please check to see how her anxiety is, and if the addition of buspirone has helped, and if twice daily alprazolam has been beneficial.  Buspirone can be increased if needed, to 10 mg twice daily.

## 2023-10-25 NOTE — TELEPHONE ENCOUNTER
"Patient states she was taken off buspirone, not sent in a new script?    She is very confused and would like a call back regarding all of her medications    She became frustrated when I asked how she was doing and said \"I went over all of this yesterday\" and hung up the phone   "

## 2023-11-02 DIAGNOSIS — D50.9 HYPOCHROMIC MICROCYTIC ANEMIA: Primary | ICD-10-CM

## 2023-11-02 DIAGNOSIS — N18.30 STAGE 3 CHRONIC KIDNEY DISEASE, UNSPECIFIED WHETHER STAGE 3A OR 3B CKD (MULTI): Chronic | ICD-10-CM

## 2023-11-07 DIAGNOSIS — F41.9 ANXIETY: ICD-10-CM

## 2023-11-08 RX ORDER — ALPRAZOLAM 0.5 MG/1
0.5 TABLET ORAL 3 TIMES DAILY PRN
Qty: 30 TABLET | Refills: 0 | Status: SHIPPED | OUTPATIENT
Start: 2023-11-08

## 2023-11-13 ENCOUNTER — LAB (OUTPATIENT)
Dept: LAB | Facility: LAB | Age: 78
End: 2023-11-13
Payer: MEDICARE

## 2023-11-13 DIAGNOSIS — E03.9 ACQUIRED HYPOTHYROIDISM: ICD-10-CM

## 2023-11-13 DIAGNOSIS — N18.30 STAGE 3 CHRONIC KIDNEY DISEASE, UNSPECIFIED WHETHER STAGE 3A OR 3B CKD (MULTI): Chronic | ICD-10-CM

## 2023-11-13 DIAGNOSIS — D50.9 HYPOCHROMIC MICROCYTIC ANEMIA: ICD-10-CM

## 2023-11-13 LAB
ANION GAP SERPL CALC-SCNC: 14 MMOL/L (ref 10–20)
BASOPHILS # BLD AUTO: 0.05 X10*3/UL (ref 0–0.1)
BASOPHILS NFR BLD AUTO: 0.8 %
BUN SERPL-MCNC: 24 MG/DL (ref 6–23)
CALCIUM SERPL-MCNC: 9 MG/DL (ref 8.6–10.3)
CHLORIDE SERPL-SCNC: 103 MMOL/L (ref 98–107)
CO2 SERPL-SCNC: 25 MMOL/L (ref 21–32)
CREAT SERPL-MCNC: 2.11 MG/DL (ref 0.5–1.05)
EOSINOPHIL # BLD AUTO: 0.1 X10*3/UL (ref 0–0.4)
EOSINOPHIL NFR BLD AUTO: 1.7 %
ERYTHROCYTE [DISTWIDTH] IN BLOOD BY AUTOMATED COUNT: 16.3 % (ref 11.5–14.5)
FERRITIN SERPL-MCNC: 20 NG/ML (ref 8–150)
FOLATE SERPL-MCNC: 18.6 NG/ML
GFR SERPL CREATININE-BSD FRML MDRD: 24 ML/MIN/1.73M*2
GLUCOSE SERPL-MCNC: 166 MG/DL (ref 74–99)
HCT VFR BLD AUTO: 29 % (ref 36–46)
HGB BLD-MCNC: 8.3 G/DL (ref 12–16)
IMM GRANULOCYTES # BLD AUTO: 0.04 X10*3/UL (ref 0–0.5)
IMM GRANULOCYTES NFR BLD AUTO: 0.7 % (ref 0–0.9)
IRON SATN MFR SERPL: ABNORMAL %
IRON SERPL-MCNC: 17 UG/DL (ref 35–150)
LYMPHOCYTES # BLD AUTO: 0.72 X10*3/UL (ref 0.8–3)
LYMPHOCYTES NFR BLD AUTO: 12 %
MCH RBC QN AUTO: 21.2 PG (ref 26–34)
MCHC RBC AUTO-ENTMCNC: 28.6 G/DL (ref 32–36)
MCV RBC AUTO: 74 FL (ref 80–100)
MONOCYTES # BLD AUTO: 0.54 X10*3/UL (ref 0.05–0.8)
MONOCYTES NFR BLD AUTO: 9 %
NEUTROPHILS # BLD AUTO: 4.56 X10*3/UL (ref 1.6–5.5)
NEUTROPHILS NFR BLD AUTO: 75.8 %
NRBC BLD-RTO: 0 /100 WBCS (ref 0–0)
PLATELET # BLD AUTO: 291 X10*3/UL (ref 150–450)
POLYCHROMASIA BLD QL SMEAR: NORMAL
POTASSIUM SERPL-SCNC: 3.8 MMOL/L (ref 3.5–5.3)
RBC # BLD AUTO: 3.92 X10*6/UL (ref 4–5.2)
RBC MORPH BLD: NORMAL
SCHISTOCYTES BLD QL SMEAR: NORMAL
SODIUM SERPL-SCNC: 138 MMOL/L (ref 136–145)
TIBC SERPL-MCNC: ABNORMAL UG/DL
TSH SERPL-ACNC: 1.67 MIU/L (ref 0.44–3.98)
UIBC SERPL-MCNC: >450 UG/DL (ref 110–370)
VIT B12 SERPL-MCNC: 339 PG/ML (ref 211–911)
WBC # BLD AUTO: 6 X10*3/UL (ref 4.4–11.3)

## 2023-11-13 PROCEDURE — 83540 ASSAY OF IRON: CPT

## 2023-11-13 PROCEDURE — 85025 COMPLETE CBC W/AUTO DIFF WBC: CPT

## 2023-11-13 PROCEDURE — 82746 ASSAY OF FOLIC ACID SERUM: CPT

## 2023-11-13 PROCEDURE — 82728 ASSAY OF FERRITIN: CPT

## 2023-11-13 PROCEDURE — 84443 ASSAY THYROID STIM HORMONE: CPT

## 2023-11-13 PROCEDURE — 36415 COLL VENOUS BLD VENIPUNCTURE: CPT

## 2023-11-13 PROCEDURE — 83550 IRON BINDING TEST: CPT

## 2023-11-13 PROCEDURE — 82607 VITAMIN B-12: CPT

## 2023-11-13 PROCEDURE — 80048 BASIC METABOLIC PNL TOTAL CA: CPT

## 2023-11-15 ENCOUNTER — TELEPHONE (OUTPATIENT)
Dept: PRIMARY CARE | Facility: CLINIC | Age: 78
End: 2023-11-15

## 2023-11-15 DIAGNOSIS — D50.9 IRON DEFICIENCY ANEMIA, UNSPECIFIED IRON DEFICIENCY ANEMIA TYPE: Primary | ICD-10-CM

## 2023-11-17 NOTE — TELEPHONE ENCOUNTER
Patient called in, I was unable to speak to her at the time she called in.    I called her back and we reviewed abnormal blood work namely low hemoglobin and hematocrit, low iron.    New onset anemia iron deficient unknown etiology.  Patient has already stopped her blood thinners she has had a colonoscopy within the last 6 months which was normal.  Her primary care doctor has advised her to follow-up with her GI doctor I would agree with this.  In addition I suggest taking over-the-counter iron 325 mg twice a day until further notice and having her blood count checked in 2 wks .  Given her other comorbidities if her hemoglobin were to drop below 7 I would suggest a blood transfusion.  I asked patient to please call us and let us know when Dr. Batista       STAFF:    get  Dr. Batista's contact information , I'd like to talk to him/ his office

## 2023-11-17 NOTE — TELEPHONE ENCOUNTER
Spoke to staff at Dr. Sarmiento's office, and she will send him a message re patient .  States she will find out if he wants to see her or will have a NP in their office see pt.   I will labs for their reference.      STAFF  Please fax the labs I printed -  in my  outbox - [phone]

## 2023-11-18 ENCOUNTER — TELEPHONE (OUTPATIENT)
Dept: PRIMARY CARE | Facility: CLINIC | Age: 78
End: 2023-11-18

## 2023-11-18 NOTE — TELEPHONE ENCOUNTER
Pt called in to leave Dr. Ghosh a message     She said she had her labs done, and was told all labs were reassuring and does not have blood in her stool. Was told to not start her iron until her next labs in two weeks.     She just wanted to let you know  I did pull the [facility] reports into her chart

## 2023-11-20 NOTE — TELEPHONE ENCOUNTER
Patient is aware, she will buy iron supplementation today.    She also has not called Dr. Sarmiento's office to schedule appointment.  I stressed how important this was and she should call today.

## 2023-11-20 NOTE — TELEPHONE ENCOUNTER
All records for GI were sent over (Dr. Sarmiento) and Dr. Sullivan spoke with provider directly    Unsure who told her that her labs were fine since the last message from our office stated the opposite.     I will call patient today to clarify she needs to be seen ASAP there and should be taking two iron tablets daily

## 2023-11-21 ENCOUNTER — TELEPHONE (OUTPATIENT)
Dept: PRIMARY CARE | Facility: CLINIC | Age: 78
End: 2023-11-21

## 2023-11-21 NOTE — TELEPHONE ENCOUNTER
"Have her try OTC \"Slow Fe Iron\" 45 mg twice daily, may be better tolerated, is extended release.  She cannot take it at the same time as her thyroid medication.  If she takes her thyroid medicine in the morning, recommend taking 1 iron tablet at lunch and 1 at dinner.      "

## 2023-11-21 NOTE — TELEPHONE ENCOUNTER
Pt calling said that she received a call yesterday that she needed to take Iron 325 mg 2 tablets 2 times a day.   Pt said gives her bad abd cramps and constipation. Please advise?

## 2023-11-25 DIAGNOSIS — E03.9 ACQUIRED HYPOTHYROIDISM: ICD-10-CM

## 2023-11-25 RX ORDER — LEVOTHYROXINE SODIUM 88 UG/1
88 TABLET ORAL DAILY
Qty: 30 TABLET | Refills: 2 | Status: SHIPPED | OUTPATIENT
Start: 2023-11-25 | End: 2024-05-16

## 2023-11-27 ENCOUNTER — LAB (OUTPATIENT)
Dept: LAB | Facility: LAB | Age: 78
End: 2023-11-27
Payer: MEDICARE

## 2023-11-27 DIAGNOSIS — D64.9 ANEMIA, UNSPECIFIED: Primary | ICD-10-CM

## 2023-11-27 PROCEDURE — 82607 VITAMIN B-12: CPT

## 2023-11-27 PROCEDURE — 83550 IRON BINDING TEST: CPT

## 2023-11-27 PROCEDURE — 83615 LACTATE (LD) (LDH) ENZYME: CPT

## 2023-11-27 PROCEDURE — 83540 ASSAY OF IRON: CPT

## 2023-11-27 PROCEDURE — 83010 ASSAY OF HAPTOGLOBIN QUANT: CPT

## 2023-11-27 PROCEDURE — 82746 ASSAY OF FOLIC ACID SERUM: CPT

## 2023-11-27 PROCEDURE — 85045 AUTOMATED RETICULOCYTE COUNT: CPT

## 2023-11-27 PROCEDURE — 82728 ASSAY OF FERRITIN: CPT

## 2023-11-27 PROCEDURE — 36415 COLL VENOUS BLD VENIPUNCTURE: CPT

## 2023-11-28 LAB
FERRITIN SERPL-MCNC: 19 NG/ML (ref 8–150)
FOLATE SERPL-MCNC: 21 NG/ML
HAPTOGLOB SERPL-MCNC: 157 MG/DL (ref 37–246)
HGB RETIC QN: 23 PG (ref 28–38)
IMMATURE RETIC FRACTION: 25.7 %
IRON SATN MFR SERPL: ABNORMAL %
IRON SERPL-MCNC: 23 UG/DL (ref 35–150)
LDH SERPL L TO P-CCNC: 176 U/L (ref 84–246)
RETICS #: 0.06 X10*6/UL (ref 0.02–0.11)
RETICS/RBC NFR AUTO: 1.5 % (ref 0.5–2)
TIBC SERPL-MCNC: ABNORMAL UG/DL
UIBC SERPL-MCNC: >450 UG/DL (ref 110–370)
VIT B12 SERPL-MCNC: 492 PG/ML (ref 211–911)

## 2023-12-05 ENCOUNTER — TELEPHONE (OUTPATIENT)
Dept: PRIMARY CARE | Facility: CLINIC | Age: 78
End: 2023-12-05

## 2023-12-05 NOTE — TELEPHONE ENCOUNTER
Pt called because she would like to know if she could be restarted on a medication. Pt would like to restart her Mysoline 50 mg. Pt states here tremors are getting worse. Pt wants to know when restarting this medication it could be at a lower dose? Pt uses [facility] in [place]. Pt is requesting a cb. If no answer she said a detailed message can be left.

## 2023-12-11 DIAGNOSIS — D50.9 IRON DEFICIENCY ANEMIA, UNSPECIFIED IRON DEFICIENCY ANEMIA TYPE: ICD-10-CM

## 2023-12-11 DIAGNOSIS — G25.0 TREMOR, ESSENTIAL: Primary | ICD-10-CM

## 2023-12-11 RX ORDER — PRIMIDONE 50 MG/1
TABLET ORAL
Qty: 90 TABLET | Refills: 1 | Status: SHIPPED | OUTPATIENT
Start: 2023-12-11 | End: 2023-12-11 | Stop reason: ENTERED-IN-ERROR

## 2023-12-11 RX ORDER — PRIMIDONE 50 MG/1
25 TABLET ORAL 3 TIMES DAILY
Qty: 45 TABLET | Refills: 1 | Status: SHIPPED | OUTPATIENT
Start: 2023-12-11 | End: 2024-01-12 | Stop reason: WASHOUT

## 2023-12-11 NOTE — TELEPHONE ENCOUNTER
Left detailed message for patient, advised to call the office back with any questions or concerns.

## 2023-12-11 NOTE — TELEPHONE ENCOUNTER
Patient aware of medication    She is asking if you want her to have any labs for her anemia?    She is sorry for sending so many messages

## 2023-12-12 NOTE — TELEPHONE ENCOUNTER
I ordered repeat labs (in our system, not sure if she'll want to get near her/[facility]).  GI's last note said to check labs, so I thought they ordered

## 2023-12-29 ENCOUNTER — TELEPHONE (OUTPATIENT)
Dept: PRIMARY CARE | Facility: CLINIC | Age: 78
End: 2023-12-29

## 2023-12-29 DIAGNOSIS — J40 BRONCHITIS: ICD-10-CM

## 2023-12-29 DIAGNOSIS — R05.1 ACUTE COUGH: Primary | ICD-10-CM

## 2023-12-29 RX ORDER — BENZONATATE 200 MG/1
200 CAPSULE ORAL 3 TIMES DAILY PRN
Qty: 30 CAPSULE | Refills: 1 | Status: SHIPPED | OUTPATIENT
Start: 2023-12-29 | End: 2024-01-12 | Stop reason: WASHOUT

## 2023-12-29 NOTE — TELEPHONE ENCOUNTER
Pt called in and because she has a cough. Pt states she has no other symptoms.Pt is requesting a cough syrup called in. Pt was requesting a message sent to [facility].

## 2023-12-29 NOTE — TELEPHONE ENCOUNTER
Because of her other medications especially alprazolam, I would prefer she take Tessalon Perles, every 8 hours as needed

## 2023-12-31 RX ORDER — ALBUTEROL SULFATE 90 UG/1
2 AEROSOL, METERED RESPIRATORY (INHALATION) EVERY 6 HOURS PRN
Qty: 18 G | Refills: 0 | Status: SHIPPED | OUTPATIENT
Start: 2023-12-31 | End: 2024-01-30 | Stop reason: WASHOUT

## 2023-12-31 RX ORDER — CODEINE PHOSPHATE AND GUAIFENESIN 10; 100 MG/5ML; MG/5ML
5 SOLUTION ORAL EVERY 6 HOURS PRN
Qty: 237 ML | Refills: 0 | Status: SHIPPED | OUTPATIENT
Start: 2023-12-31 | End: 2024-01-30 | Stop reason: ALTCHOICE

## 2023-12-31 RX ORDER — DOXYCYCLINE 100 MG/1
CAPSULE ORAL
Qty: 14 CAPSULE | Refills: 0 | Status: SHIPPED | OUTPATIENT
Start: 2023-12-31 | End: 2023-12-31 | Stop reason: ENTERED-IN-ERROR

## 2023-12-31 NOTE — PROGRESS NOTES
Pt c/o harsh cough for 3-4 days, no fever, CP, SOB.  Some nasal congestion, minimal ST.  Tested covid negative  Likely viral illness, I sent albuterol inhaler and cough syrup  Call or go to ER if chest symptoms worsen significantly

## 2024-01-03 DIAGNOSIS — J40 BRONCHITIS: ICD-10-CM

## 2024-01-03 DIAGNOSIS — R05.1 ACUTE COUGH: ICD-10-CM

## 2024-01-03 NOTE — TELEPHONE ENCOUNTER
"Patient states she has noticed improvement in symptoms since starting codeine-guaifenesin (Robitussin-AC)  mg/5 mL syrup 12/31/23    She is asking for a refill of this, only has enough left for one dose     I did advise her this bottle should have enough mL's in it to last well over 10 days and patient/ did admit perhaps they did not measure correctly    Advised they should be taking as prescribed      She states she is open to any cough syrup you recommend but \"this one works\"      "

## 2024-01-03 NOTE — TELEPHONE ENCOUNTER
(Yes, even if she was taking 10 mL 4 times daily she should have enough for 3-4 more days.  She can finish her current bottle, will need to get by with Mucinex DM or Robitussin DM)

## 2024-01-11 RX ORDER — RIVAROXABAN 15 MG/1
TABLET, FILM COATED ORAL
COMMUNITY
Start: 2023-09-25 | End: 2024-01-30 | Stop reason: ALTCHOICE

## 2024-01-12 ENCOUNTER — OFFICE VISIT (OUTPATIENT)
Dept: PRIMARY CARE | Facility: CLINIC | Age: 79
End: 2024-01-12
Payer: MEDICARE

## 2024-01-12 VITALS
HEART RATE: 62 BPM | TEMPERATURE: 98 F | WEIGHT: 122.8 LBS | SYSTOLIC BLOOD PRESSURE: 133 MMHG | BODY MASS INDEX: 25.67 KG/M2 | DIASTOLIC BLOOD PRESSURE: 58 MMHG | OXYGEN SATURATION: 95 %

## 2024-01-12 DIAGNOSIS — N76.0 ACUTE VAGINITIS: ICD-10-CM

## 2024-01-12 DIAGNOSIS — R39.9 URINARY SYMPTOM OR SIGN: Primary | ICD-10-CM

## 2024-01-12 LAB
POC APPEARANCE, URINE: CLEAR
POC BILIRUBIN, URINE: ABNORMAL
POC BLOOD, URINE: ABNORMAL
POC COLOR, URINE: YELLOW
POC GLUCOSE, URINE: NEGATIVE MG/DL
POC KETONES, URINE: ABNORMAL MG/DL
POC LEUKOCYTES, URINE: ABNORMAL
POC NITRITE,URINE: NEGATIVE
POC PH, URINE: 5.5 PH
POC PROTEIN, URINE: ABNORMAL MG/DL
POC SPECIFIC GRAVITY, URINE: >=1.03
POC UROBILINOGEN, URINE: 1 EU/DL

## 2024-01-12 PROCEDURE — 1126F AMNT PAIN NOTED NONE PRSNT: CPT | Performed by: FAMILY MEDICINE

## 2024-01-12 PROCEDURE — 87205 SMEAR GRAM STAIN: CPT

## 2024-01-12 PROCEDURE — 3078F DIAST BP <80 MM HG: CPT | Performed by: FAMILY MEDICINE

## 2024-01-12 PROCEDURE — 87591 N.GONORRHOEAE DNA AMP PROB: CPT

## 2024-01-12 PROCEDURE — 1159F MED LIST DOCD IN RCRD: CPT | Performed by: FAMILY MEDICINE

## 2024-01-12 PROCEDURE — 81003 URINALYSIS AUTO W/O SCOPE: CPT | Performed by: FAMILY MEDICINE

## 2024-01-12 PROCEDURE — 87800 DETECT AGNT MULT DNA DIREC: CPT

## 2024-01-12 PROCEDURE — 99214 OFFICE O/P EST MOD 30 MIN: CPT | Performed by: FAMILY MEDICINE

## 2024-01-12 PROCEDURE — 3075F SYST BP GE 130 - 139MM HG: CPT | Performed by: FAMILY MEDICINE

## 2024-01-12 PROCEDURE — 1036F TOBACCO NON-USER: CPT | Performed by: FAMILY MEDICINE

## 2024-01-12 PROCEDURE — 87491 CHLMYD TRACH DNA AMP PROBE: CPT

## 2024-01-12 PROCEDURE — 87661 TRICHOMONAS VAGINALIS AMPLIF: CPT

## 2024-01-12 RX ORDER — NITROFURANTOIN 25; 75 MG/1; MG/1
100 CAPSULE ORAL 2 TIMES DAILY
Qty: 14 CAPSULE | Refills: 0 | Status: SHIPPED | OUTPATIENT
Start: 2024-01-12 | End: 2024-01-19

## 2024-01-12 NOTE — PROGRESS NOTES
Subjective   Patient ID: Lorri Velasquez is a 78 y.o. female who presents for UTI (Burning with and without urination, Itching. /Started x1 week ago. OTC: cranberry pills, vaginal cream ).  HPI    Here w spouse.     Pt reports some burning with urination, urge to go ,  vaginal irritation and itching. Otc meds not helping much.   Started approx a week ago     Pt is newly sexually active after years of not being .     No rash .  No vaginal pain .   Using otc KY or replens gel   No new soaps/ lotions/ personal products.   No fever.   No diarrhea/ constipation.    Has a hemorrhoid, but not currently an issue.     Review of Systems      Hx of Hysterectomy 40 yrs ago ,  for cycles/ to stop bleeding.   Objective   /58 (BP Location: Right arm, Patient Position: Sitting, BP Cuff Size: Adult)   Pulse 62   Temp 36.7 °C (98 °F) (Temporal)   Wt 55.7 kg (122 lb 12.8 oz)   SpO2 95%   BMI 25.67 kg/m²   Pt declined chaperone  Physical Exam  Gen: alert. No distress   CV: regular  Abdomen: nontender  Ext Genitalia : normal. Mild atrophic changes  Hemorrhoid, external., not bleeding  Small speculum.  Thin white discharge present. Mild bleeding with swabs .    Assessment/Plan   Problem List Items Addressed This Visit    None  Visit Diagnoses       Urinary symptom or sign    -  Primary    Relevant Medications    nitrofurantoin, macrocrystal-monohydrate, (Macrobid) 100 mg capsule    Other Relevant Orders    POCT UA Automated manually resulted    Acute vaginitis        Relevant Orders    Vaginitis Gram Stain For Bacterial Vaginosis + Yeast    C. trachomatis / N. gonorrhoeae, DNA probe    Trichomonas vaginalis, Amplified          Urinary Sxs   - empiric tx.  - not enough sample to run a culture    Vaginitis   - no significant irritation/ discharge   - wait on tx, till cultures are back    Sexual activity - agree w use of otc lubricantsprn, avoid any with perfumes/ dyes. UTIs can be more likely after intercourse. If  continues to have sxs , could consider post coital antibx .     Will have to monitor progress.          FIDELIA Sullivan MD

## 2024-01-13 LAB
C TRACH RRNA SPEC QL NAA+PROBE: NEGATIVE
CLUE CELLS VAG LPF-#/AREA: NORMAL /[LPF]
N GONORRHOEA DNA SPEC QL PROBE+SIG AMP: NEGATIVE
NUGENT SCORE: 2
T VAGINALIS RRNA SPEC QL NAA+PROBE: NEGATIVE
YEAST VAG WET PREP-#/AREA: NORMAL

## 2024-01-30 ENCOUNTER — TELEPHONE (OUTPATIENT)
Dept: PRIMARY CARE | Facility: CLINIC | Age: 79
End: 2024-01-30

## 2024-01-30 ENCOUNTER — LAB (OUTPATIENT)
Dept: LAB | Facility: LAB | Age: 79
End: 2024-01-30
Payer: MEDICARE

## 2024-01-30 ENCOUNTER — OFFICE VISIT (OUTPATIENT)
Dept: PRIMARY CARE | Facility: CLINIC | Age: 79
End: 2024-01-30
Payer: MEDICARE

## 2024-01-30 VITALS
SYSTOLIC BLOOD PRESSURE: 142 MMHG | TEMPERATURE: 97.4 F | HEART RATE: 69 BPM | OXYGEN SATURATION: 93 % | DIASTOLIC BLOOD PRESSURE: 67 MMHG | BODY MASS INDEX: 25.18 KG/M2 | WEIGHT: 120.5 LBS

## 2024-01-30 DIAGNOSIS — D50.9 IRON DEFICIENCY ANEMIA, UNSPECIFIED IRON DEFICIENCY ANEMIA TYPE: Primary | ICD-10-CM

## 2024-01-30 DIAGNOSIS — N39.0 RECURRENT UTI: ICD-10-CM

## 2024-01-30 DIAGNOSIS — E11.21 DIABETIC NEPHROPATHY ASSOCIATED WITH TYPE 2 DIABETES MELLITUS (MULTI): ICD-10-CM

## 2024-01-30 DIAGNOSIS — E03.9 ACQUIRED HYPOTHYROIDISM: ICD-10-CM

## 2024-01-30 DIAGNOSIS — N18.31 STAGE 3A CHRONIC KIDNEY DISEASE (MULTI): Chronic | ICD-10-CM

## 2024-01-30 DIAGNOSIS — E11.00 TYPE 2 DIABETES MELLITUS WITH HYPEROSMOLARITY WITHOUT COMA, WITHOUT LONG-TERM CURRENT USE OF INSULIN (MULTI): ICD-10-CM

## 2024-01-30 DIAGNOSIS — I10 BENIGN ESSENTIAL HYPERTENSION: ICD-10-CM

## 2024-01-30 DIAGNOSIS — G25.0 TREMOR, ESSENTIAL: ICD-10-CM

## 2024-01-30 DIAGNOSIS — D50.9 IRON DEFICIENCY ANEMIA, UNSPECIFIED IRON DEFICIENCY ANEMIA TYPE: ICD-10-CM

## 2024-01-30 DIAGNOSIS — F41.9 ANXIETY: ICD-10-CM

## 2024-01-30 PROCEDURE — 84443 ASSAY THYROID STIM HORMONE: CPT

## 2024-01-30 PROCEDURE — 81001 URINALYSIS AUTO W/SCOPE: CPT

## 2024-01-30 PROCEDURE — 84439 ASSAY OF FREE THYROXINE: CPT

## 2024-01-30 PROCEDURE — 1160F RVW MEDS BY RX/DR IN RCRD: CPT | Performed by: FAMILY MEDICINE

## 2024-01-30 PROCEDURE — 83036 HEMOGLOBIN GLYCOSYLATED A1C: CPT

## 2024-01-30 PROCEDURE — 99214 OFFICE O/P EST MOD 30 MIN: CPT | Performed by: FAMILY MEDICINE

## 2024-01-30 PROCEDURE — 3077F SYST BP >= 140 MM HG: CPT | Performed by: FAMILY MEDICINE

## 2024-01-30 PROCEDURE — 87086 URINE CULTURE/COLONY COUNT: CPT

## 2024-01-30 PROCEDURE — 82607 VITAMIN B-12: CPT

## 2024-01-30 PROCEDURE — 83550 IRON BINDING TEST: CPT

## 2024-01-30 PROCEDURE — 1159F MED LIST DOCD IN RCRD: CPT | Performed by: FAMILY MEDICINE

## 2024-01-30 PROCEDURE — 80053 COMPREHEN METABOLIC PANEL: CPT

## 2024-01-30 PROCEDURE — 1126F AMNT PAIN NOTED NONE PRSNT: CPT | Performed by: FAMILY MEDICINE

## 2024-01-30 PROCEDURE — 3078F DIAST BP <80 MM HG: CPT | Performed by: FAMILY MEDICINE

## 2024-01-30 PROCEDURE — 83540 ASSAY OF IRON: CPT

## 2024-01-30 PROCEDURE — 1036F TOBACCO NON-USER: CPT | Performed by: FAMILY MEDICINE

## 2024-01-30 PROCEDURE — 85025 COMPLETE CBC W/AUTO DIFF WBC: CPT

## 2024-01-30 PROCEDURE — 36415 COLL VENOUS BLD VENIPUNCTURE: CPT

## 2024-01-30 RX ORDER — PROPRANOLOL HYDROCHLORIDE 10 MG/1
TABLET ORAL
Qty: 90 TABLET | Refills: 2 | Status: CANCELLED | OUTPATIENT
Start: 2024-01-30

## 2024-01-30 RX ORDER — PROPRANOLOL HYDROCHLORIDE 10 MG/1
TABLET ORAL
Qty: 90 TABLET | Refills: 2 | Status: SHIPPED | OUTPATIENT
Start: 2024-01-30 | End: 2024-01-30 | Stop reason: SDUPTHER

## 2024-01-30 RX ORDER — PROPRANOLOL HYDROCHLORIDE 10 MG/1
TABLET ORAL
Qty: 90 TABLET | Refills: 2 | Status: SHIPPED | OUTPATIENT
Start: 2024-01-30 | End: 2024-05-14

## 2024-01-30 NOTE — PATIENT INSTRUCTIONS
Discontinue terazosin  Start propranolol 10 mg twice daily, can increase to 3 times daily if needed for tremor  Check labs and urine today  If anxiety is doing well in the month, consider decreasing the escitalopram (Lexapro) to 10 mg daily, call for 10 mg tablet prescription if needed

## 2024-01-30 NOTE — PROGRESS NOTES
Subjective   Patient ID: Lorri Velasquez is a 79 y.o. female who presents for Follow-up (Pt would like to discuss medications. She would also like to discuss her blood counts. ).    HPI   Ronna was seen today for a routine follow-up of her medications, including those for hypertension, anxiety, atrial fibrillation, hypothyroidism.  Medication(s) are being taken and tolerated as prescribed, without concerns, list reconciled today.  There are no complaints of chest pain, shortness of breath, lower extremity edema, or exertional concerns.  Essential tremors are sufficiently bothersome that she would like to consider options.  A trial of primidone was unsuccessful, had significant side effects from it.  She continues to take 1 iron tablet daily, in the last 6 months or so has had fairly significant iron deficiency anemia secondary to chronic blood loss.  She has been well-maintained on pantoprazole, would like labs updated today.  Her anxiety has overall been stable, takes Lexapro 20 mg daily, alprazolam as needed.  CSA is up-to-date, UDS needed, she was unable to void at last check.  She does have some urinary frequency.  Review of Systems  The full review of systems is negative with the exception of what is noted in HPI    Objective   /67 (BP Location: Right arm, Patient Position: Sitting, BP Cuff Size: Adult)   Pulse 69   Temp 36.3 °C (97.4 °F) (Temporal)   Wt 54.7 kg (120 lb 8 oz)   SpO2 93%   BMI 25.18 kg/m²     Physical Exam  Cardiac exam reveals a regular rate and rhythm,  murmur present.   Lungs are clear bilaterally.    No lower extremity edema present.  Constitutional/General appearance: alert, oriented, well-appearing, in no distress  Head and face exam is normal  No scleral icterus or conjunctival erythema present  Hearing is grossly normal  Respiratory effort is normal, no dyspnea noted  Cortical function is normal  Mood, affect, are pleasant, appropriate, and interactive.  She is mildly  anxious, insight is fair.    Assessment/Plan     Discontinue terazosin  Start propranolol 10 mg twice daily, can increase to 3 times daily if needed for tremor  Check labs and urine today  If anxiety is doing well in the month, consider decreasing the escitalopram (Lexapro) to 10 mg daily, call for 10 mg tablet prescription if needed  Follow-up as scheduled    Total time spent with patient was 30 minutes, well over half of it spent counseling and/or coordinating care

## 2024-01-30 NOTE — TELEPHONE ENCOUNTER
This patients propranolol went to the [facility] in [place] and it was supposed to go to the [facility] [address] in [place]. Please fix and then notify patient.

## 2024-01-30 NOTE — TELEPHONE ENCOUNTER
I resent to [facility], please cancel [facility].    (Today I sent it to her default pharmacy, no other mention was made)

## 2024-01-31 LAB
ALBUMIN SERPL BCP-MCNC: 3.9 G/DL (ref 3.4–5)
ALP SERPL-CCNC: 151 U/L (ref 33–136)
ALT SERPL W P-5'-P-CCNC: 34 U/L (ref 7–45)
ANION GAP SERPL CALC-SCNC: 16 MMOL/L (ref 10–20)
APPEARANCE UR: CLEAR
AST SERPL W P-5'-P-CCNC: 48 U/L (ref 9–39)
BACTERIA #/AREA URNS AUTO: ABNORMAL /HPF
BASOPHILS # BLD AUTO: 0.06 X10*3/UL (ref 0–0.1)
BASOPHILS NFR BLD AUTO: 0.8 %
BILIRUB SERPL-MCNC: 0.5 MG/DL (ref 0–1.2)
BILIRUB UR STRIP.AUTO-MCNC: NEGATIVE MG/DL
BUN SERPL-MCNC: 20 MG/DL (ref 6–23)
CALCIUM SERPL-MCNC: 9.1 MG/DL (ref 8.6–10.6)
CHLORIDE SERPL-SCNC: 102 MMOL/L (ref 98–107)
CO2 SERPL-SCNC: 27 MMOL/L (ref 21–32)
COLOR UR: YELLOW
CREAT SERPL-MCNC: 1.77 MG/DL (ref 0.5–1.05)
EGFRCR SERPLBLD CKD-EPI 2021: 29 ML/MIN/1.73M*2
EOSINOPHIL # BLD AUTO: 0.1 X10*3/UL (ref 0–0.4)
EOSINOPHIL NFR BLD AUTO: 1.3 %
ERYTHROCYTE [DISTWIDTH] IN BLOOD BY AUTOMATED COUNT: 19.1 % (ref 11.5–14.5)
EST. AVERAGE GLUCOSE BLD GHB EST-MCNC: 134 MG/DL
GLUCOSE SERPL-MCNC: 93 MG/DL (ref 74–99)
GLUCOSE UR STRIP.AUTO-MCNC: NEGATIVE MG/DL
HBA1C MFR BLD: 6.3 %
HCT VFR BLD AUTO: 41.2 % (ref 36–46)
HGB BLD-MCNC: 12.5 G/DL (ref 12–16)
HYALINE CASTS #/AREA URNS AUTO: ABNORMAL /LPF
IMM GRANULOCYTES # BLD AUTO: 0.02 X10*3/UL (ref 0–0.5)
IMM GRANULOCYTES NFR BLD AUTO: 0.3 % (ref 0–0.9)
IRON SATN MFR SERPL: 24 % (ref 25–45)
IRON SERPL-MCNC: 105 UG/DL (ref 35–150)
KETONES UR STRIP.AUTO-MCNC: NEGATIVE MG/DL
LEUKOCYTE ESTERASE UR QL STRIP.AUTO: NEGATIVE
LYMPHOCYTES # BLD AUTO: 0.95 X10*3/UL (ref 0.8–3)
LYMPHOCYTES NFR BLD AUTO: 12.6 %
MCH RBC QN AUTO: 27 PG (ref 26–34)
MCHC RBC AUTO-ENTMCNC: 30.3 G/DL (ref 32–36)
MCV RBC AUTO: 89 FL (ref 80–100)
MONOCYTES # BLD AUTO: 0.75 X10*3/UL (ref 0.05–0.8)
MONOCYTES NFR BLD AUTO: 10 %
MUCOUS THREADS #/AREA URNS AUTO: ABNORMAL /LPF
NEUTROPHILS # BLD AUTO: 5.63 X10*3/UL (ref 1.6–5.5)
NEUTROPHILS NFR BLD AUTO: 75 %
NITRITE UR QL STRIP.AUTO: NEGATIVE
NRBC BLD-RTO: 0 /100 WBCS (ref 0–0)
PH UR STRIP.AUTO: 6 [PH]
PLATELET # BLD AUTO: 203 X10*3/UL (ref 150–450)
POTASSIUM SERPL-SCNC: 4 MMOL/L (ref 3.5–5.3)
PROT SERPL-MCNC: 6.8 G/DL (ref 6.4–8.2)
PROT UR STRIP.AUTO-MCNC: ABNORMAL MG/DL
RBC # BLD AUTO: 4.63 X10*6/UL (ref 4–5.2)
RBC # UR STRIP.AUTO: NEGATIVE /UL
RBC #/AREA URNS AUTO: ABNORMAL /HPF
SODIUM SERPL-SCNC: 141 MMOL/L (ref 136–145)
SP GR UR STRIP.AUTO: 1.02
SQUAMOUS #/AREA URNS AUTO: ABNORMAL /HPF
T4 FREE SERPL-MCNC: 1.39 NG/DL (ref 0.78–1.48)
TIBC SERPL-MCNC: 429 UG/DL (ref 240–445)
TSH SERPL-ACNC: 5.52 MIU/L (ref 0.44–3.98)
UIBC SERPL-MCNC: 324 UG/DL (ref 110–370)
UROBILINOGEN UR STRIP.AUTO-MCNC: 0.2 MG/DL
VIT B12 SERPL-MCNC: 1370 PG/ML (ref 211–911)
WBC # BLD AUTO: 7.5 X10*3/UL (ref 4.4–11.3)
WBC #/AREA URNS AUTO: ABNORMAL /HPF

## 2024-01-31 ASSESSMENT — PATIENT HEALTH QUESTIONNAIRE - PHQ9
8. MOVING OR SPEAKING SO SLOWLY THAT OTHER PEOPLE COULD HAVE NOTICED. OR THE OPPOSITE, BEING SO FIGETY OR RESTLESS THAT YOU HAVE BEEN MOVING AROUND A LOT MORE THAN USUAL: NOT AT ALL
3. TROUBLE FALLING OR STAYING ASLEEP OR SLEEPING TOO MUCH: MORE THAN HALF THE DAYS
7. TROUBLE CONCENTRATING ON THINGS, SUCH AS READING THE NEWSPAPER OR WATCHING TELEVISION: NOT AT ALL
10. IF YOU CHECKED OFF ANY PROBLEMS, HOW DIFFICULT HAVE THESE PROBLEMS MADE IT FOR YOU TO DO YOUR WORK, TAKE CARE OF THINGS AT HOME, OR GET ALONG WITH OTHER PEOPLE: SOMEWHAT DIFFICULT
SUM OF ALL RESPONSES TO PHQ QUESTIONS 1-9: 4
6. FEELING BAD ABOUT YOURSELF - OR THAT YOU ARE A FAILURE OR HAVE LET YOURSELF OR YOUR FAMILY DOWN: NOT AT ALL
2. FEELING DOWN, DEPRESSED OR HOPELESS: NOT AT ALL
SUM OF ALL RESPONSES TO PHQ9 QUESTIONS 1 AND 2: 0
4. FEELING TIRED OR HAVING LITTLE ENERGY: MORE THAN HALF THE DAYS
9. THOUGHTS THAT YOU WOULD BE BETTER OFF DEAD, OR OF HURTING YOURSELF: NOT AT ALL
5. POOR APPETITE OR OVEREATING: NOT AT ALL
1. LITTLE INTEREST OR PLEASURE IN DOING THINGS: NOT AT ALL

## 2024-01-31 ASSESSMENT — ANXIETY QUESTIONNAIRES
4. TROUBLE RELAXING: NOT AT ALL
5. BEING SO RESTLESS THAT IT IS HARD TO SIT STILL: NOT AT ALL
GAD7 TOTAL SCORE: 0
6. BECOMING EASILY ANNOYED OR IRRITABLE: NOT AT ALL
2. NOT BEING ABLE TO STOP OR CONTROL WORRYING: NOT AT ALL
7. FEELING AFRAID AS IF SOMETHING AWFUL MIGHT HAPPEN: NOT AT ALL
3. WORRYING TOO MUCH ABOUT DIFFERENT THINGS: NOT AT ALL
IF YOU CHECKED OFF ANY PROBLEMS ON THIS QUESTIONNAIRE, HOW DIFFICULT HAVE THESE PROBLEMS MADE IT FOR YOU TO DO YOUR WORK, TAKE CARE OF THINGS AT HOME, OR GET ALONG WITH OTHER PEOPLE: NOT DIFFICULT AT ALL
1. FEELING NERVOUS, ANXIOUS, OR ON EDGE: NOT AT ALL

## 2024-02-01 LAB — BACTERIA UR CULT: NORMAL

## 2024-02-02 ENCOUNTER — TELEPHONE (OUTPATIENT)
Dept: PRIMARY CARE | Facility: CLINIC | Age: 79
End: 2024-02-02
Payer: MEDICARE

## 2024-02-02 NOTE — TELEPHONE ENCOUNTER
Patient called in was wondering if you got her lab results back she is wondering about the results?       [phone]

## 2024-02-05 NOTE — RESULT ENCOUNTER NOTE
A1c is 6.3, refecting great control of her sugars  Hemoglobin (red blood cell count) is normal, 12.5.  Iron level is normal.  Kidney function reduce moderately, a little better than last check  No change in meds needed

## 2024-04-18 ENCOUNTER — OFFICE VISIT (OUTPATIENT)
Dept: PRIMARY CARE | Facility: CLINIC | Age: 79
End: 2024-04-18
Payer: MEDICARE

## 2024-04-18 VITALS
SYSTOLIC BLOOD PRESSURE: 132 MMHG | DIASTOLIC BLOOD PRESSURE: 84 MMHG | TEMPERATURE: 97.8 F | BODY MASS INDEX: 25.71 KG/M2 | WEIGHT: 123 LBS | RESPIRATION RATE: 14 BRPM | OXYGEN SATURATION: 94 % | HEART RATE: 88 BPM

## 2024-04-18 DIAGNOSIS — S00.412A ABRASION OF LEFT EAR CANAL, INITIAL ENCOUNTER: Primary | ICD-10-CM

## 2024-04-18 PROBLEM — R63.0 ANOREXIA: Status: RESOLVED | Noted: 2019-12-25 | Resolved: 2024-04-18

## 2024-04-18 PROBLEM — M79.81 NONTRAUMATIC RECTUS HEMATOMA: Status: RESOLVED | Noted: 2019-03-25 | Resolved: 2024-04-18

## 2024-04-18 PROBLEM — R42 LIGHTHEADEDNESS: Status: RESOLVED | Noted: 2019-03-25 | Resolved: 2024-04-18

## 2024-04-18 PROBLEM — J01.90 ACUTE SINUSITIS: Status: RESOLVED | Noted: 2023-04-28 | Resolved: 2024-04-18

## 2024-04-18 PROBLEM — S92.352A CLOSED DISPLACED FRACTURE OF FIFTH METATARSAL BONE OF LEFT FOOT: Status: RESOLVED | Noted: 2023-04-28 | Resolved: 2024-04-18

## 2024-04-18 PROBLEM — S06.0X1A CONCUSSION WTH LOSS OF CONSCIOUSNESS OF 30 MINUTES OR LESS: Status: RESOLVED | Noted: 2019-08-15 | Resolved: 2024-04-18

## 2024-04-18 PROBLEM — M25.572 ACUTE LEFT ANKLE PAIN: Status: RESOLVED | Noted: 2023-04-28 | Resolved: 2024-04-18

## 2024-04-18 PROBLEM — R53.81 DEBILITY: Status: RESOLVED | Noted: 2019-12-25 | Resolved: 2024-04-18

## 2024-04-18 PROBLEM — J20.9 ACUTE BRONCHITIS: Status: RESOLVED | Noted: 2023-04-28 | Resolved: 2024-04-18

## 2024-04-18 PROBLEM — N28.9 FUNCTION KIDNEY DECREASED: Status: RESOLVED | Noted: 2023-04-28 | Resolved: 2024-04-18

## 2024-04-18 PROBLEM — N17.9 AKI (ACUTE KIDNEY INJURY) (CMS-HCC): Status: RESOLVED | Noted: 2019-12-25 | Resolved: 2024-04-18

## 2024-04-18 PROBLEM — S30.1XXA HEMATOMA OF RECTUS SHEATH: Status: RESOLVED | Noted: 2019-08-14 | Resolved: 2024-04-18

## 2024-04-18 PROBLEM — F41.9 ANXIETY: Status: RESOLVED | Noted: 2023-04-28 | Resolved: 2024-04-18

## 2024-04-18 PROBLEM — I95.9 HYPOTENSION: Status: RESOLVED | Noted: 2019-12-24 | Resolved: 2024-04-18

## 2024-04-18 PROBLEM — S09.90XA CLOSED HEAD INJURY: Status: RESOLVED | Noted: 2019-08-24 | Resolved: 2024-04-18

## 2024-04-18 PROCEDURE — 3075F SYST BP GE 130 - 139MM HG: CPT | Performed by: FAMILY MEDICINE

## 2024-04-18 PROCEDURE — 1160F RVW MEDS BY RX/DR IN RCRD: CPT | Performed by: FAMILY MEDICINE

## 2024-04-18 PROCEDURE — 3079F DIAST BP 80-89 MM HG: CPT | Performed by: FAMILY MEDICINE

## 2024-04-18 PROCEDURE — 1036F TOBACCO NON-USER: CPT | Performed by: FAMILY MEDICINE

## 2024-04-18 PROCEDURE — 99213 OFFICE O/P EST LOW 20 MIN: CPT | Performed by: FAMILY MEDICINE

## 2024-04-18 PROCEDURE — 1159F MED LIST DOCD IN RCRD: CPT | Performed by: FAMILY MEDICINE

## 2024-04-18 RX ORDER — TRIAMCINOLONE ACETONIDE 1 MG/G
CREAM TOPICAL 2 TIMES DAILY
Qty: 15 G | Refills: 1 | Status: SHIPPED | OUTPATIENT
Start: 2024-04-18

## 2024-04-18 NOTE — PROGRESS NOTES
Subjective   Patient ID: Lorri Velasquez is a 79 y.o. female who presents for Ear Drainage.    Feels like she has blood in her left ear, had some blood on the qtip.   Normally wears hearing aids.       She does not recall any injury or trauma. She does clean her ears out with Q-tips, that is how she noticed the blood. She also states that her ears have been itchy, mainly when wearing her hearing aids. She has not been wearing the aids do to this.         Review of Systems   HENT:  Positive for ear discharge (blood) and ear pain (Itchy with hearing aids).        Objective   /84 (BP Location: Left arm, Patient Position: Sitting, BP Cuff Size: Adult)   Pulse 88   Temp 36.6 °C (97.8 °F) (Temporal)   Resp 14   Wt 55.8 kg (123 lb)   SpO2 94%   BMI 25.71 kg/m²     Physical Exam  Constitutional:       Appearance: Normal appearance.   HENT:      Left Ear: Tenderness present.      Ears:      Comments: Most likely minor abrasion caused by hearing aids  Neurological:      General: No focal deficit present.      Mental Status: She is alert.   Psychiatric:         Mood and Affect: Mood normal.         Behavior: Behavior normal.         Thought Content: Thought content normal.         Judgment: Judgment normal.         Assessment/Plan   Problem List Items Addressed This Visit    None  Visit Diagnoses       Abrasion of left ear canal, initial encounter    -  Primary    Relevant Medications    triamcinolone (Kenalog) 0.1 % cream            Scribe Attestation  By signing my name below, Rekha JAY Scribe   attest that this documentation has been prepared under the direction and in the presence of Ricardo Najera MD.

## 2024-05-14 DIAGNOSIS — G25.0 TREMOR, ESSENTIAL: ICD-10-CM

## 2024-05-14 RX ORDER — PROPRANOLOL HYDROCHLORIDE 10 MG/1
TABLET ORAL
Qty: 270 TABLET | Refills: 1 | Status: SHIPPED | OUTPATIENT
Start: 2024-05-14

## 2024-05-16 DIAGNOSIS — E03.9 ACQUIRED HYPOTHYROIDISM: ICD-10-CM

## 2024-05-16 RX ORDER — LEVOTHYROXINE SODIUM 88 UG/1
88 TABLET ORAL DAILY
Qty: 90 TABLET | Refills: 0 | Status: SHIPPED | OUTPATIENT
Start: 2024-05-16 | End: 2025-05-16

## 2024-06-21 ENCOUNTER — TELEPHONE (OUTPATIENT)
Dept: PRIMARY CARE | Facility: CLINIC | Age: 79
End: 2024-06-21
Payer: MEDICARE

## 2024-06-21 DIAGNOSIS — F41.9 ANXIETY: ICD-10-CM

## 2024-06-21 DIAGNOSIS — E78.2 MIXED HYPERLIPIDEMIA: ICD-10-CM

## 2024-06-21 RX ORDER — ALPRAZOLAM 0.5 MG/1
0.5 TABLET ORAL 3 TIMES DAILY PRN
Qty: 30 TABLET | Refills: 0 | Status: SHIPPED | OUTPATIENT
Start: 2024-06-21

## 2024-06-21 RX ORDER — ALPRAZOLAM 0.5 MG/1
0.5 TABLET ORAL 3 TIMES DAILY PRN
Qty: 30 TABLET | Refills: 0 | Status: CANCELLED | OUTPATIENT
Start: 2024-06-21

## 2024-06-21 RX ORDER — ATORVASTATIN CALCIUM 40 MG/1
40 TABLET, FILM COATED ORAL DAILY
Qty: 90 TABLET | Refills: 3 | Status: SHIPPED | OUTPATIENT
Start: 2024-06-21

## 2024-06-21 RX ORDER — ATORVASTATIN CALCIUM 40 MG/1
40 TABLET, FILM COATED ORAL DAILY
Qty: 90 TABLET | Refills: 1 | Status: CANCELLED | OUTPATIENT
Start: 2024-06-21

## 2024-06-21 NOTE — TELEPHONE ENCOUNTER
Patient called requesting a refill on:     Atorvastatin 40 mg   Alprazolam 0.5 mg     Next OV is 12/17/24     [facility] [address]

## 2024-07-23 DIAGNOSIS — I10 BENIGN ESSENTIAL HYPERTENSION: ICD-10-CM

## 2024-07-24 RX ORDER — TERAZOSIN 1 MG/1
1 CAPSULE ORAL NIGHTLY
Qty: 90 CAPSULE | Refills: 1 | Status: SHIPPED | OUTPATIENT
Start: 2024-07-24

## 2024-08-12 DIAGNOSIS — E03.9 ACQUIRED HYPOTHYROIDISM: ICD-10-CM

## 2024-08-12 RX ORDER — LEVOTHYROXINE SODIUM 88 UG/1
88 TABLET ORAL DAILY
Qty: 90 TABLET | Refills: 0 | Status: SHIPPED | OUTPATIENT
Start: 2024-08-12

## 2024-08-14 ENCOUNTER — TELEPHONE (OUTPATIENT)
Dept: PRIMARY CARE | Facility: CLINIC | Age: 79
End: 2024-08-14
Payer: MEDICARE

## 2024-08-14 DIAGNOSIS — F41.9 ANXIETY: ICD-10-CM

## 2024-08-14 RX ORDER — ALPRAZOLAM 0.5 MG/1
0.5 TABLET ORAL 3 TIMES DAILY PRN
Qty: 30 TABLET | Refills: 0 | OUTPATIENT
Start: 2024-08-14

## 2024-08-14 RX ORDER — ALPRAZOLAM 0.5 MG/1
0.5 TABLET ORAL 3 TIMES DAILY PRN
Qty: 30 TABLET | Refills: 2 | Status: SHIPPED | OUTPATIENT
Start: 2024-08-14

## 2024-08-14 NOTE — TELEPHONE ENCOUNTER
Pt called for med refill. Pt needs a refill on her Xanax 0.5 mg. [facility] [place]. Pt's next ov 12/17/24.

## 2024-08-15 DIAGNOSIS — I48.11 LONGSTANDING PERSISTENT ATRIAL FIBRILLATION (MULTI): ICD-10-CM

## 2024-08-15 DIAGNOSIS — G25.0 TREMOR, ESSENTIAL: ICD-10-CM

## 2024-08-16 RX ORDER — AMIODARONE HYDROCHLORIDE 100 MG/1
100 TABLET ORAL DAILY
Qty: 90 TABLET | Refills: 1 | Status: SHIPPED | OUTPATIENT
Start: 2024-08-16

## 2024-08-16 RX ORDER — PROPRANOLOL HYDROCHLORIDE 10 MG/1
TABLET ORAL
Qty: 270 TABLET | Refills: 1 | Status: SHIPPED | OUTPATIENT
Start: 2024-08-16

## 2024-09-09 ENCOUNTER — TELEPHONE (OUTPATIENT)
Dept: PRIMARY CARE | Facility: CLINIC | Age: 79
End: 2024-09-09
Payer: MEDICARE

## 2024-09-09 NOTE — TELEPHONE ENCOUNTER
Pt called because she is having issues breathing. She states she is not sick. She is out of breath after walking up the stairs or doing every day things. Pt states she sometimes feels like she is going to pass out. Pt would like to schedule an office visit with only CHELSI. You have no openings till March. Please advise.

## 2024-09-09 NOTE — TELEPHONE ENCOUNTER
Because of her cardiac issues, I'd advise she make an appt with her cardiologist, or with any available provider here (though not ideal).  If symptoms are worsening, may need ER visit to urgently evaluate

## 2024-10-11 DIAGNOSIS — E11.00 TYPE 2 DIABETES MELLITUS WITH HYPEROSMOLARITY WITHOUT COMA, WITHOUT LONG-TERM CURRENT USE OF INSULIN (MULTI): Chronic | ICD-10-CM

## 2024-10-12 RX ORDER — GLIMEPIRIDE 1 MG/1
1 TABLET ORAL DAILY
Qty: 90 TABLET | Refills: 1 | Status: SHIPPED | OUTPATIENT
Start: 2024-10-12

## 2024-10-26 DIAGNOSIS — I10 BENIGN ESSENTIAL HYPERTENSION: ICD-10-CM

## 2024-10-26 DIAGNOSIS — F34.1 PERSISTENT DEPRESSIVE DISORDER: ICD-10-CM

## 2024-10-26 DIAGNOSIS — K21.00 GASTROESOPHAGEAL REFLUX DISEASE WITH ESOPHAGITIS WITHOUT HEMORRHAGE: ICD-10-CM

## 2024-10-26 DIAGNOSIS — F41.9 ANXIETY: ICD-10-CM

## 2024-10-26 RX ORDER — SPIRONOLACTONE 25 MG/1
25 TABLET ORAL DAILY
Qty: 90 TABLET | Refills: 1 | Status: SHIPPED | OUTPATIENT
Start: 2024-10-26

## 2024-10-26 RX ORDER — ESCITALOPRAM OXALATE 20 MG/1
20 TABLET ORAL DAILY
Qty: 90 TABLET | Refills: 1 | Status: SHIPPED | OUTPATIENT
Start: 2024-10-26

## 2024-10-26 RX ORDER — PANTOPRAZOLE SODIUM 40 MG/1
40 TABLET, DELAYED RELEASE ORAL DAILY
Qty: 90 TABLET | Refills: 1 | Status: SHIPPED | OUTPATIENT
Start: 2024-10-26

## 2024-11-05 DIAGNOSIS — F41.9 ANXIETY: ICD-10-CM

## 2024-11-06 RX ORDER — ALPRAZOLAM 0.5 MG/1
0.5 TABLET ORAL 3 TIMES DAILY PRN
Qty: 30 TABLET | Refills: 2 | OUTPATIENT
Start: 2024-11-06

## 2024-11-07 NOTE — TELEPHONE ENCOUNTER
I spoke to Lorri and let her know that [facility] does not want her taking that medication at this time and will discuss in December appointment.     She understood and will wait until visit.

## 2024-11-10 DIAGNOSIS — E03.9 ACQUIRED HYPOTHYROIDISM: ICD-10-CM

## 2024-11-11 RX ORDER — LEVOTHYROXINE SODIUM 88 UG/1
88 TABLET ORAL DAILY
Qty: 90 TABLET | Refills: 0 | Status: SHIPPED | OUTPATIENT
Start: 2024-11-11

## 2024-12-17 ENCOUNTER — APPOINTMENT (OUTPATIENT)
Dept: PRIMARY CARE | Facility: CLINIC | Age: 79
End: 2024-12-17
Payer: MEDICARE

## 2024-12-17 ENCOUNTER — LAB (OUTPATIENT)
Dept: LAB | Facility: LAB | Age: 79
End: 2024-12-17
Payer: MEDICARE

## 2024-12-17 ENCOUNTER — HOSPITAL ENCOUNTER (OUTPATIENT)
Dept: RADIOLOGY | Facility: EXTERNAL LOCATION | Age: 79
Discharge: HOME | End: 2024-12-17

## 2024-12-17 ENCOUNTER — TELEPHONE (OUTPATIENT)
Dept: PRIMARY CARE | Facility: CLINIC | Age: 79
End: 2024-12-17

## 2024-12-17 VITALS
HEIGHT: 58 IN | BODY MASS INDEX: 28.55 KG/M2 | OXYGEN SATURATION: 95 % | HEART RATE: 62 BPM | SYSTOLIC BLOOD PRESSURE: 130 MMHG | DIASTOLIC BLOOD PRESSURE: 79 MMHG | WEIGHT: 136 LBS

## 2024-12-17 DIAGNOSIS — E03.9 HYPOTHYROIDISM, UNSPECIFIED TYPE: ICD-10-CM

## 2024-12-17 DIAGNOSIS — M54.31 RIGHT SCIATIC NERVE PAIN: ICD-10-CM

## 2024-12-17 DIAGNOSIS — R06.00 DYSPNEA, UNSPECIFIED TYPE: Primary | ICD-10-CM

## 2024-12-17 DIAGNOSIS — E03.9 ACQUIRED HYPOTHYROIDISM: ICD-10-CM

## 2024-12-17 DIAGNOSIS — E11.21 DIABETIC NEPHROPATHY ASSOCIATED WITH TYPE 2 DIABETES MELLITUS (MULTI): ICD-10-CM

## 2024-12-17 DIAGNOSIS — E11.21 TYPE 2 DIABETES MELLITUS WITH DIABETIC NEPHROPATHY: Primary | ICD-10-CM

## 2024-12-17 DIAGNOSIS — I10 BENIGN ESSENTIAL HYPERTENSION: ICD-10-CM

## 2024-12-17 DIAGNOSIS — E11.00 TYPE 2 DIABETES MELLITUS WITH HYPEROSMOLARITY WITHOUT COMA, WITHOUT LONG-TERM CURRENT USE OF INSULIN (MULTI): ICD-10-CM

## 2024-12-17 DIAGNOSIS — E78.2 MIXED HYPERLIPIDEMIA: ICD-10-CM

## 2024-12-17 DIAGNOSIS — Z23 IMMUNIZATION DUE: ICD-10-CM

## 2024-12-17 LAB
ALBUMIN SERPL BCP-MCNC: 4 G/DL (ref 3.4–5)
ALP SERPL-CCNC: 55 U/L (ref 33–136)
ALT SERPL W P-5'-P-CCNC: 22 U/L (ref 7–45)
ANION GAP SERPL CALC-SCNC: 15 MMOL/L (ref 10–20)
AST SERPL W P-5'-P-CCNC: 25 U/L (ref 9–39)
BASOPHILS # BLD AUTO: 0.05 X10*3/UL (ref 0–0.1)
BASOPHILS NFR BLD AUTO: 0.8 %
BILIRUB SERPL-MCNC: 0.8 MG/DL (ref 0–1.2)
BUN SERPL-MCNC: 30 MG/DL (ref 6–23)
CALCIUM SERPL-MCNC: 9.1 MG/DL (ref 8.6–10.6)
CHLORIDE SERPL-SCNC: 105 MMOL/L (ref 98–107)
CHOLEST SERPL-MCNC: 117 MG/DL (ref 0–199)
CHOLESTEROL/HDL RATIO: 3.4
CO2 SERPL-SCNC: 25 MMOL/L (ref 21–32)
CREAT SERPL-MCNC: 2.02 MG/DL (ref 0.5–1.05)
EGFRCR SERPLBLD CKD-EPI 2021: 25 ML/MIN/1.73M*2
EOSINOPHIL # BLD AUTO: 0.14 X10*3/UL (ref 0–0.4)
EOSINOPHIL NFR BLD AUTO: 2.4 %
ERYTHROCYTE [DISTWIDTH] IN BLOOD BY AUTOMATED COUNT: 14.8 % (ref 11.5–14.5)
EST. AVERAGE GLUCOSE BLD GHB EST-MCNC: 174 MG/DL
GLUCOSE SERPL-MCNC: 190 MG/DL (ref 74–99)
HBA1C MFR BLD: 7.7 %
HCT VFR BLD AUTO: 39.1 % (ref 36–46)
HDLC SERPL-MCNC: 34.9 MG/DL
HGB BLD-MCNC: 12.1 G/DL (ref 12–16)
IMM GRANULOCYTES # BLD AUTO: 0.04 X10*3/UL (ref 0–0.5)
IMM GRANULOCYTES NFR BLD AUTO: 0.7 % (ref 0–0.9)
LDLC SERPL CALC-MCNC: 51 MG/DL
LYMPHOCYTES # BLD AUTO: 0.87 X10*3/UL (ref 0.8–3)
LYMPHOCYTES NFR BLD AUTO: 14.6 %
MCH RBC QN AUTO: 28.3 PG (ref 26–34)
MCHC RBC AUTO-ENTMCNC: 30.9 G/DL (ref 32–36)
MCV RBC AUTO: 92 FL (ref 80–100)
MONOCYTES # BLD AUTO: 0.46 X10*3/UL (ref 0.05–0.8)
MONOCYTES NFR BLD AUTO: 7.7 %
NEUTROPHILS # BLD AUTO: 4.38 X10*3/UL (ref 1.6–5.5)
NEUTROPHILS NFR BLD AUTO: 73.8 %
NON HDL CHOLESTEROL: 82 MG/DL (ref 0–149)
NRBC BLD-RTO: 0 /100 WBCS (ref 0–0)
PLATELET # BLD AUTO: 167 X10*3/UL (ref 150–450)
POTASSIUM SERPL-SCNC: 5 MMOL/L (ref 3.5–5.3)
PROT SERPL-MCNC: 6.3 G/DL (ref 6.4–8.2)
RBC # BLD AUTO: 4.27 X10*6/UL (ref 4–5.2)
SODIUM SERPL-SCNC: 140 MMOL/L (ref 136–145)
TRIGL SERPL-MCNC: 158 MG/DL (ref 0–149)
TSH SERPL-ACNC: 1.45 MIU/L (ref 0.44–3.98)
VLDL: 32 MG/DL (ref 0–40)
WBC # BLD AUTO: 5.9 X10*3/UL (ref 4.4–11.3)

## 2024-12-17 PROCEDURE — 80061 LIPID PANEL: CPT

## 2024-12-17 PROCEDURE — 80053 COMPREHEN METABOLIC PANEL: CPT

## 2024-12-17 PROCEDURE — 3078F DIAST BP <80 MM HG: CPT | Performed by: FAMILY MEDICINE

## 2024-12-17 PROCEDURE — 36415 COLL VENOUS BLD VENIPUNCTURE: CPT

## 2024-12-17 PROCEDURE — 85025 COMPLETE CBC W/AUTO DIFF WBC: CPT

## 2024-12-17 PROCEDURE — 90662 IIV NO PRSV INCREASED AG IM: CPT | Performed by: FAMILY MEDICINE

## 2024-12-17 PROCEDURE — 84443 ASSAY THYROID STIM HORMONE: CPT

## 2024-12-17 PROCEDURE — 3075F SYST BP GE 130 - 139MM HG: CPT | Performed by: FAMILY MEDICINE

## 2024-12-17 PROCEDURE — G0008 ADMIN INFLUENZA VIRUS VAC: HCPCS | Performed by: FAMILY MEDICINE

## 2024-12-17 PROCEDURE — 99214 OFFICE O/P EST MOD 30 MIN: CPT | Performed by: FAMILY MEDICINE

## 2024-12-17 PROCEDURE — 1159F MED LIST DOCD IN RCRD: CPT | Performed by: FAMILY MEDICINE

## 2024-12-17 PROCEDURE — 1160F RVW MEDS BY RX/DR IN RCRD: CPT | Performed by: FAMILY MEDICINE

## 2024-12-17 PROCEDURE — 1170F FXNL STATUS ASSESSED: CPT | Performed by: FAMILY MEDICINE

## 2024-12-17 PROCEDURE — G0439 PPPS, SUBSEQ VISIT: HCPCS | Performed by: FAMILY MEDICINE

## 2024-12-17 PROCEDURE — 83036 HEMOGLOBIN GLYCOSYLATED A1C: CPT

## 2024-12-17 RX ORDER — TRAMADOL HYDROCHLORIDE 50 MG/1
50 TABLET ORAL AS NEEDED
COMMUNITY
Start: 2024-06-27 | End: 2024-12-17 | Stop reason: WASHOUT

## 2024-12-17 ASSESSMENT — ENCOUNTER SYMPTOMS
DEPRESSION: 1
OCCASIONAL FEELINGS OF UNSTEADINESS: 1
LOSS OF SENSATION IN FEET: 1

## 2024-12-17 ASSESSMENT — ACTIVITIES OF DAILY LIVING (ADL)
TAKING_MEDICATION: INDEPENDENT
BATHING: INDEPENDENT
DRESSING: INDEPENDENT
GROCERY_SHOPPING: INDEPENDENT
MANAGING_FINANCES: INDEPENDENT
DOING_HOUSEWORK: INDEPENDENT

## 2024-12-17 ASSESSMENT — PATIENT HEALTH QUESTIONNAIRE - PHQ9
4. FEELING TIRED OR HAVING LITTLE ENERGY: SEVERAL DAYS
6. FEELING BAD ABOUT YOURSELF - OR THAT YOU ARE A FAILURE OR HAVE LET YOURSELF OR YOUR FAMILY DOWN: NOT AT ALL
SUM OF ALL RESPONSES TO PHQ9 QUESTIONS 1 AND 2: 1
7. TROUBLE CONCENTRATING ON THINGS, SUCH AS READING THE NEWSPAPER OR WATCHING TELEVISION: NOT AT ALL
3. TROUBLE FALLING OR STAYING ASLEEP OR SLEEPING TOO MUCH: NOT AT ALL
SUM OF ALL RESPONSES TO PHQ QUESTIONS 1-9: 3
8. MOVING OR SPEAKING SO SLOWLY THAT OTHER PEOPLE COULD HAVE NOTICED. OR THE OPPOSITE, BEING SO FIGETY OR RESTLESS THAT YOU HAVE BEEN MOVING AROUND A LOT MORE THAN USUAL: NOT AT ALL
1. LITTLE INTEREST OR PLEASURE IN DOING THINGS: NOT AT ALL
5. POOR APPETITE OR OVEREATING: SEVERAL DAYS
2. FEELING DOWN, DEPRESSED OR HOPELESS: SEVERAL DAYS
9. THOUGHTS THAT YOU WOULD BE BETTER OFF DEAD, OR OF HURTING YOURSELF: NOT AT ALL

## 2024-12-17 NOTE — PROGRESS NOTES
"Subjective   Patient ID: Lorri Velasquez is a 79 y.o. female who presents for Medicare Annual Wellness Visit Subsequent (Yearly exam ).    [facility]   Ronna was seen today for a follow-up and review of her medications, as well as an annual Medicare wellness review.  Medication(s) are being taken and tolerated as prescribed, without concerns, list reconciled today.  She would like both the ears checked, wonders if she has cerumen.  She continues to have low back pain with right-sided sciatica symptoms.  Ronna also notes the presence of mild dyspnea, occasionally at rest, occasionally with activity, without chest pain or palpitations.  Cardiology care is up-to-date.  She does not have significant cough, wheezing, has had no recent respiratory illnesses.    Medicare wellness checklist:  She is due for flu vaccine  Pneumococcal series up-to-date  She is eligible for the Shingrix and Tdap vaccines.  Colonoscopy--per Dr Sarmiento  She had an EGD November 2023 as part of a anemia workup.  She had an esophageal stricture, dilation performed.  Mammogram declined  DEXA declined  Pap smear Not warranted    She no longer takes alprazolam, is comfortable with her regimen of Lexapro.    Review of Systems  The full, 10+ multi-organ review of systems, is within normal limits with the exception of what is noted above in HPI.  Objective   /79   Pulse 62   Ht 1.473 m (4' 10\")   Wt 61.7 kg (136 lb)   SpO2 95%   BMI 28.42 kg/m²     Physical Exam  Constitutional/General appearance: alert, oriented, well-appearing, in no distress  Head and face exam is normal  No scleral icterus or conjunctival erythema present  Hearing is mildly reduced  Respiratory effort is normal, no dyspnea noted  Cortical function is normal  Mood, affect, are pleasant, appropriate, and interactive.  Insight is fair  Cardiac exam reveals a regular rate and rhythm, murmur present.   Lungs are clear bilaterally.    No lower extremity edema " present.    Assessment/Plan     See HPI for AMV checklist  For back/arthritis/sciatica pain, can take Tylenol 500 mg, take 2 tablets 3 x daily MAX  Discontinue Aleve, cannot take with Eliquis    Check CXR for dyspnea, consider follow up with cardio, PFTs  Labs and urine ordered  PT order provided for LBP and right sided sciatica    Follow up in 3-6 months    Total time spent with patient, reviewing records, and completing charting was 42 minutes, over half of it spent counseling and/or coordinating care  **Portions of this medical record have been created using voice recognition software and may have minor errors which are inherent in voice recognition systems. It has not been fully edited for typographical or grammatical errors**

## 2024-12-17 NOTE — TELEPHONE ENCOUNTER
Vincent called in from  lab said that  Albumin-creatinine Ration Urine Random and Urinalysis w reflex Miscroscopic     Both test where cancelled not enough specimen        [phone]

## 2024-12-22 DIAGNOSIS — N18.4 CKD (CHRONIC KIDNEY DISEASE) STAGE 4, GFR 15-29 ML/MIN (MULTI): ICD-10-CM

## 2024-12-22 DIAGNOSIS — E11.21 DIABETIC NEPHROPATHY ASSOCIATED WITH TYPE 2 DIABETES MELLITUS (MULTI): Primary | ICD-10-CM

## 2024-12-22 RX ORDER — DAPAGLIFLOZIN 5 MG/1
5 TABLET, FILM COATED ORAL DAILY
Qty: 90 TABLET | Refills: 1 | Status: SHIPPED | OUTPATIENT
Start: 2024-12-22

## 2024-12-22 NOTE — RESULT ENCOUNTER NOTE
Labs are all ok with 2 exceptions:  A1c is higher, and kidney function is a little worse  To help both, recommend starting a medicine that can lower sugars and help kidneys:  Farxiga 5 mg daily.  Rx sent.  Also recommend checking an US of the kidneys, order placed

## 2024-12-30 ENCOUNTER — TELEPHONE (OUTPATIENT)
Dept: PRIMARY CARE | Facility: CLINIC | Age: 79
End: 2024-12-30
Payer: MEDICARE

## 2024-12-30 DIAGNOSIS — J98.6 ELEVATED HEMIDIAPHRAGM: Primary | ICD-10-CM

## 2024-12-30 NOTE — TELEPHONE ENCOUNTER
Please let Ronna know she needs the farxiga (for her diabetes), and should be off the glimepiride.

## 2024-12-30 NOTE — TELEPHONE ENCOUNTER
Per consent, I left detailed message on VM with the message from Dr. Ghosh. Pt was advised to give the office a call back with any questions/concerns.

## 2024-12-30 NOTE — TELEPHONE ENCOUNTER
Patient called as to why dapagliflozin propanediol (Farxiga) 5 mg was prescribed and whether or not she should continue taking Glimepribe 1mg?

## 2025-01-02 ENCOUNTER — HOSPITAL ENCOUNTER (OUTPATIENT)
Dept: RADIOLOGY | Facility: EXTERNAL LOCATION | Age: 80
Discharge: HOME | End: 2025-01-02

## 2025-01-02 ENCOUNTER — TELEPHONE (OUTPATIENT)
Dept: PRIMARY CARE | Facility: CLINIC | Age: 80
End: 2025-01-02
Payer: MEDICARE

## 2025-01-02 DIAGNOSIS — J98.6 ELEVATED HEMIDIAPHRAGM: ICD-10-CM

## 2025-01-02 NOTE — TELEPHONE ENCOUNTER
Chest xray shows no infection or fluid, does show her diaphragm on the right side is elevated, likely not working, makes it more difficult to fully expand the lungs, and can cause some SOB    Recommend a pulmonary referral to discuss further.  Needs a pulmonologist at [facility], close to where she lives

## 2025-01-02 NOTE — TELEPHONE ENCOUNTER
Called patient and advised.  Gave patient Apurva's number to call tomorrow to schedule.  Patient would like to see someone within .

## 2025-01-03 NOTE — TELEPHONE ENCOUNTER
Patient called and she said was put on dapagliflozin propanediol (Farxiga) 5 mg med for diabetes.  Do you want her to take this along with her her current med.     Also, she is concerned about her memory.  She would like your thoughts on what to do about this.

## 2025-01-05 NOTE — TELEPHONE ENCOUNTER
She should be taking Farxiga only for her diabetes  No glimepiride, which she was previously taking

## 2025-01-07 ENCOUNTER — HOSPITAL ENCOUNTER (OUTPATIENT)
Dept: RADIOLOGY | Facility: CLINIC | Age: 80
Discharge: HOME | End: 2025-01-07
Payer: MEDICARE

## 2025-01-07 DIAGNOSIS — N18.4 CKD (CHRONIC KIDNEY DISEASE) STAGE 4, GFR 15-29 ML/MIN (MULTI): ICD-10-CM

## 2025-01-07 DIAGNOSIS — E11.21 DIABETIC NEPHROPATHY ASSOCIATED WITH TYPE 2 DIABETES MELLITUS (MULTI): ICD-10-CM

## 2025-01-07 PROCEDURE — 76770 US EXAM ABDO BACK WALL COMP: CPT

## 2025-01-07 PROCEDURE — 76770 US EXAM ABDO BACK WALL COMP: CPT | Performed by: RADIOLOGY

## 2025-01-10 NOTE — RESULT ENCOUNTER NOTE
Ultrasound of kidneys just shows signs of having high blood pressure and sugars over the years, no tumor, kidney stone, or other abnormality.  Recommend she see a kidney specialist, likely at [facility], closer to where she lives.    I don't know the resources there.  We could try to find someone there and schedule, unless she knows of someone.

## 2025-01-12 NOTE — PROGRESS NOTES
FAMILY MEDICINE  OFFICE VISIT   Lorri Velasquez  [id number]  1945    PCP: Bayron Ghosh MD     Chief Complaint: No chief complaint on file.    SUBJECTIVE     Lorri Velasquez is a 79 y.o. ***-speaking female with pertinent PMHx of ***, who presents to the clinic with complaints of ***.    Patient is new to me as PCP, as Dr. Ghosh is leaving the practice in January.       HPI      The following portions of the patient's chart were reviewed in this encounter and updated as appropriate:         Home Medication List:  Current Outpatient Medications   Medication Instructions    amiodarone (PACERONE) 100 mg, oral, Daily    atorvastatin (LIPITOR) 40 mg, oral, Daily, as directed    dapagliflozin propanediol (FARXIGA) 5 mg, oral, Daily, For diabetes and kidney disease    escitalopram (LEXAPRO) 20 mg, oral, Daily    hydroCHLOROthiazide (MICROZIDE) 12.5 mg, oral, Daily    levothyroxine (SYNTHROID, LEVOXYL) 88 mcg, oral, Daily    pantoprazole (PROTONIX) 40 mg, oral, Daily    propranolol (Inderal) 10 mg tablet 1 TABLET BY MOUTH 2-3 TIMES DAILY FOR TREMOR    spironolactone (ALDACTONE) 25 mg, oral, Daily    terazosin (HYTRIN) 1 mg, oral, Nightly    triamcinolone (Kenalog) 0.1 % cream Topical, 2 times daily, Apply inside left ear canal twice daily for 1-2 weeks         OBJECTIVE   There were no vitals taken for this visit.  Vital signs and pulse oximetry reviewed.     Physical Exam  ***    ASSESSMENT & PLAN     Problem List Items Addressed This Visit    None      ***    Follow-Up Recommendations: ***    Please excuse any typos or grammatical errors, part of this note was constructed with Dragon dictation software.    Rosalinda James DO, Steven  [facility]   Office: [phone]  1/12/2025 6:06 PM

## 2025-01-13 ENCOUNTER — APPOINTMENT (OUTPATIENT)
Dept: PRIMARY CARE | Facility: CLINIC | Age: 80
End: 2025-01-13
Payer: MEDICARE

## 2025-01-22 ENCOUNTER — TELEPHONE (OUTPATIENT)
Dept: PRIMARY CARE | Facility: CLINIC | Age: 80
End: 2025-01-22
Payer: MEDICARE

## 2025-01-22 NOTE — TELEPHONE ENCOUNTER
Patient left a message on the referral line stating she was waiting o a referral for pulmonology, she wants to go near [place].  I do not see a referral in her chart

## 2025-01-23 NOTE — TELEPHONE ENCOUNTER
"\"Ronna\" needed a nephrology referral, not pulmonology.  Do we have any near [place]?  She usually goes to [place] for specialists because of proximity  "

## 2025-01-29 DIAGNOSIS — I10 BENIGN ESSENTIAL HYPERTENSION: ICD-10-CM

## 2025-01-29 RX ORDER — TERAZOSIN 1 MG/1
1 CAPSULE ORAL NIGHTLY
Qty: 90 CAPSULE | Refills: 0 | Status: SHIPPED | OUTPATIENT
Start: 2025-01-29

## 2025-02-06 DIAGNOSIS — E03.9 ACQUIRED HYPOTHYROIDISM: ICD-10-CM

## 2025-02-06 RX ORDER — LEVOTHYROXINE SODIUM 88 UG/1
88 TABLET ORAL DAILY
Qty: 90 TABLET | Refills: 0 | Status: SHIPPED | OUTPATIENT
Start: 2025-02-06

## 2025-02-06 NOTE — TELEPHONE ENCOUNTER
BRIEF NOTE    Subjective/Objective:  Pharmacy refill request for Levothyroxine. Patient establishing with Dr. Sullivan tomorrow.     Assessment/Plan:  1. Acquired hypothyroidism  - levothyroxine (Synthroid, Levoxyl) 88 mcg tablet; Take 1 tablet (88 mcg) by mouth once daily.  Dispense: 90 tablet; Refill: 0      No problem-specific Assessment & Plan notes found for this encounter.        Rosalinda James DO, MSEd  [facility]  Office: [phone]  2/6/2025 5:37 PM

## 2025-02-07 ENCOUNTER — OFFICE VISIT (OUTPATIENT)
Dept: PRIMARY CARE | Facility: CLINIC | Age: 80
End: 2025-02-07
Payer: MEDICARE

## 2025-02-07 VITALS
RESPIRATION RATE: 12 BRPM | HEART RATE: 73 BPM | SYSTOLIC BLOOD PRESSURE: 133 MMHG | DIASTOLIC BLOOD PRESSURE: 79 MMHG | OXYGEN SATURATION: 95 % | BODY MASS INDEX: 27.92 KG/M2 | TEMPERATURE: 97.9 F | WEIGHT: 133.6 LBS

## 2025-02-07 DIAGNOSIS — R41.3 MEMORY LOSS: Primary | ICD-10-CM

## 2025-02-07 DIAGNOSIS — D64.9 ANEMIA, UNSPECIFIED TYPE: ICD-10-CM

## 2025-02-07 DIAGNOSIS — R41.82 ALTERED MENTAL STATUS, UNSPECIFIED ALTERED MENTAL STATUS TYPE: ICD-10-CM

## 2025-02-07 DIAGNOSIS — R44.1 VISUAL HALLUCINATIONS: ICD-10-CM

## 2025-02-07 PROCEDURE — 3078F DIAST BP <80 MM HG: CPT | Performed by: FAMILY MEDICINE

## 2025-02-07 PROCEDURE — G2211 COMPLEX E/M VISIT ADD ON: HCPCS | Performed by: FAMILY MEDICINE

## 2025-02-07 PROCEDURE — 99214 OFFICE O/P EST MOD 30 MIN: CPT | Performed by: FAMILY MEDICINE

## 2025-02-07 PROCEDURE — 1160F RVW MEDS BY RX/DR IN RCRD: CPT | Performed by: FAMILY MEDICINE

## 2025-02-07 PROCEDURE — 3075F SYST BP GE 130 - 139MM HG: CPT | Performed by: FAMILY MEDICINE

## 2025-02-07 PROCEDURE — 1159F MED LIST DOCD IN RCRD: CPT | Performed by: FAMILY MEDICINE

## 2025-02-07 PROCEDURE — 1123F ACP DISCUSS/DSCN MKR DOCD: CPT | Performed by: FAMILY MEDICINE

## 2025-02-07 ASSESSMENT — PATIENT HEALTH QUESTIONNAIRE - PHQ9
SUM OF ALL RESPONSES TO PHQ9 QUESTIONS 1 AND 2: 0
1. LITTLE INTEREST OR PLEASURE IN DOING THINGS: NOT AT ALL
2. FEELING DOWN, DEPRESSED OR HOPELESS: NOT AT ALL

## 2025-02-07 NOTE — PROGRESS NOTES
" Subjective   Patient ID: Lorri Velasquez is a 80 y.o. female who presents for DISCUSS MEMORY ISSUES (Pt presents to discuss memory issues./Pt had flu vaccine 12-17-24.).  HPI  NEW to me,  pt of Dr. Ghosh.  Here w spouse.       C/o  memory concerns. Describes  will  her phone and not know what to do/ how ./ why she has it.   Thinks she is saying a word,  chekcing with others/ spouse \" is that right/ did I say that right?\"   (He states she is right part of the time, slightly off  in pronunciation sometimes. )    States prev PCP aware and advsied memory check, and she saw someone,  was told had a minor issue in memory. Can't remember exactly .     Denies difficulty with tasks like driving ,writing, reading .  No seizures , no loc.     Review of Systems    No auditory hallucinations.   Has visual hallucinations ( she describes seeing a shadow moving  )     [employer]    Retired . Worked various positions / jobs .    to current spouse for approx a year       Objective   /79 (BP Location: Left arm, Patient Position: Sitting, BP Cuff Size: Adult)   Pulse 73   Temp 36.6 °C (97.9 °F) (Temporal)   Resp 12   Wt 60.6 kg (133 lb 9.6 oz)   SpO2 95%   BMI 27.92 kg/m²     Physical Exam  Gen alert.  Anxious.   No acute distress .    Slight tremor head, hands  .      Finger to nose intact.  Face symmetric.  Perrla . Eomi .    Full rom neck, back, shoulders, arms, hands,  legs . Slr neg   Reflex 1/4 throughout    Neg clonus  Shin to toe normal.   Rhomberg negative      CV irregular  Lungs clear no wheze or rales  Extr no edema   Affect :  anxious     Current Outpatient Medications   Medication Sig Dispense Refill    atorvastatin (Lipitor) 40 mg tablet Take 1 tablet (40 mg) by mouth once daily. as directed 90 tablet 3    dapagliflozin propanediol (Farxiga) 5 mg Take 1 tablet (5 mg) by mouth once daily. For diabetes and kidney disease 90 tablet 1    escitalopram (Lexapro) 20 mg tablet TAKE 1 TABLET BY MOUTH " EVERY DAY 90 tablet 1    levothyroxine (Synthroid, Levoxyl) 88 mcg tablet Take 1 tablet (88 mcg) by mouth once daily. 90 tablet 0    pantoprazole (ProtoNix) 40 mg EC tablet TAKE 1 TABLET BY MOUTH EVERY DAY 90 tablet 1    propranolol (Inderal) 10 mg tablet 1 TABLET BY MOUTH 2-3 TIMES DAILY FOR TREMOR 270 tablet 1    spironolactone (Aldactone) 25 mg tablet TAKE 1 TABLET BY MOUTH ONCE DAILY 90 tablet 1    terazosin (Hytrin) 1 mg capsule TAKE 1 CAPSULE (1 MG) BY MOUTH ONCE DAILY AT BEDTIME. 90 capsule 0    amiodarone (Pacerone) 100 mg tablet TAKE 1 TABLET BY MOUTH ONCE DAILY 90 tablet 1    hydroCHLOROthiazide (HYDRODiuril) 12.5 mg tablet Take 1 tablet by mouth once daily (Patient not taking: Reported on 2/7/2025) 90 tablet 3    triamcinolone (Kenalog) 0.1 % cream Apply topically 2 times a day. Apply inside left ear canal twice daily for 1-2 weeks (Patient not taking: Reported on 2/7/2025) 15 g 1     No current facility-administered medications for this visit.     Chart review shows an assessment for memory in 2020 .  ( 3/20/2020 )  seescanned copy for details and recommendations   Assessment/Plan   Problem List Items Addressed This Visit          Medium    Anemia    Relevant Orders    CBC and Auto Differential    Iron and TIBC     Other Visit Diagnoses       Memory loss    -  Primary    Relevant Orders    CT head w IV contrast    Visual hallucinations        Relevant Orders    CT head w IV contrast    Altered mental status, unspecified altered mental status type        Relevant Orders    CT head w IV contrast    Comprehensive metabolic panel    Vitamin B12          Mental status changes    New since summer 2024   multiple possible etiologies (age, heart related, thrombotic/ TIAs , post anesthesia  - had surgery in the summer  ,  vascular, medications,  depression, chronic sleep deprivation/ sleep disorder  )   Recommend head imaging (CT since pt has pacemaker) ;  blood work ;  use reminders/ lists to assist her memory .     Agree with plan to see [facility] ,      Consider med changes (different ssri)  ; sleep study ;  neurology consult  ;  scales for dep/ anx   We did not discuss memory medication  at this visit     CKD 4 , not discussed directly at todays visit .        FIDELIA Sullivan MD

## 2025-02-13 LAB
ALBUMIN SERPL-MCNC: 4.1 G/DL (ref 3.6–5.1)
ALP SERPL-CCNC: 70 U/L (ref 37–153)
ALT SERPL-CCNC: 36 U/L (ref 6–29)
ANION GAP SERPL CALCULATED.4IONS-SCNC: 10 MMOL/L (CALC) (ref 7–17)
AST SERPL-CCNC: 42 U/L (ref 10–35)
BASOPHILS # BLD AUTO: 49 CELLS/UL (ref 0–200)
BASOPHILS NFR BLD AUTO: 0.8 %
BILIRUB SERPL-MCNC: 0.7 MG/DL (ref 0.2–1.2)
BUN SERPL-MCNC: 20 MG/DL (ref 7–25)
CALCIUM SERPL-MCNC: 9 MG/DL (ref 8.6–10.4)
CHLORIDE SERPL-SCNC: 103 MMOL/L (ref 98–110)
CO2 SERPL-SCNC: 25 MMOL/L (ref 20–32)
CREAT SERPL-MCNC: 1.61 MG/DL (ref 0.6–1)
EGFRCR SERPLBLD CKD-EPI 2021: 32 ML/MIN/1.73M2
EOSINOPHIL # BLD AUTO: 159 CELLS/UL (ref 15–500)
EOSINOPHIL NFR BLD AUTO: 2.6 %
ERYTHROCYTE [DISTWIDTH] IN BLOOD BY AUTOMATED COUNT: 13 % (ref 11–15)
GLUCOSE SERPL-MCNC: 216 MG/DL (ref 65–99)
HCT VFR BLD AUTO: 42 % (ref 35–45)
HGB BLD-MCNC: 13.2 G/DL (ref 11.7–15.5)
IRON SATN MFR SERPL: 14 % (CALC) (ref 16–45)
IRON SERPL-MCNC: 59 MCG/DL (ref 45–160)
LYMPHOCYTES # BLD AUTO: 976 CELLS/UL (ref 850–3900)
LYMPHOCYTES NFR BLD AUTO: 16 %
MCH RBC QN AUTO: 28.5 PG (ref 27–33)
MCHC RBC AUTO-ENTMCNC: 31.4 G/DL (ref 32–36)
MCV RBC AUTO: 90.7 FL (ref 80–100)
MONOCYTES # BLD AUTO: 592 CELLS/UL (ref 200–950)
MONOCYTES NFR BLD AUTO: 9.7 %
NEUTROPHILS # BLD AUTO: 4325 CELLS/UL (ref 1500–7800)
NEUTROPHILS NFR BLD AUTO: 70.9 %
PLATELET # BLD AUTO: 183 THOUSAND/UL (ref 140–400)
PMV BLD REES-ECKER: 10.4 FL (ref 7.5–12.5)
POTASSIUM SERPL-SCNC: 4.8 MMOL/L (ref 3.5–5.3)
PROT SERPL-MCNC: 6.5 G/DL (ref 6.1–8.1)
RBC # BLD AUTO: 4.63 MILLION/UL (ref 3.8–5.1)
SODIUM SERPL-SCNC: 138 MMOL/L (ref 135–146)
TIBC SERPL-MCNC: 417 MCG/DL (CALC) (ref 250–450)
VIT B12 SERPL-MCNC: 644 PG/ML (ref 200–1100)
WBC # BLD AUTO: 6.1 THOUSAND/UL (ref 3.8–10.8)

## 2025-02-18 PROBLEM — M62.08 RECTUS DIASTASIS: Status: RESOLVED | Noted: 2023-04-28 | Resolved: 2025-02-18

## 2025-02-18 PROBLEM — E11.21 DIABETIC NEPHROPATHY (MULTI): Status: RESOLVED | Noted: 2023-04-28 | Resolved: 2025-02-18

## 2025-02-18 PROBLEM — E11.22 TYPE 2 DIABETES MELLITUS WITH CHRONIC KIDNEY DISEASE, WITHOUT LONG-TERM CURRENT USE OF INSULIN (MULTI): Status: ACTIVE | Noted: 2019-03-25

## 2025-02-18 PROBLEM — F32.A DEPRESSION: Status: RESOLVED | Noted: 2023-04-28 | Resolved: 2025-02-18

## 2025-02-18 PROBLEM — M76.60 CALCIFIC ACHILLES TENDINITIS: Status: RESOLVED | Noted: 2023-04-28 | Resolved: 2025-02-18

## 2025-02-18 PROBLEM — M54.2 CERVICAL PAIN (NECK): Status: RESOLVED | Noted: 2019-03-26 | Resolved: 2025-02-18

## 2025-02-18 PROBLEM — D64.9 ANEMIA: Status: RESOLVED | Noted: 2019-12-25 | Resolved: 2025-02-18

## 2025-02-18 PROBLEM — E28.39 ESTROGEN DEFICIENCY: Status: RESOLVED | Noted: 2023-04-28 | Resolved: 2025-02-18

## 2025-02-18 PROBLEM — I48.91 ATRIAL FIBRILLATION (MULTI): Status: RESOLVED | Noted: 2019-03-25 | Resolved: 2025-02-18

## 2025-02-18 PROBLEM — E78.00 HIGH CHOLESTEROL: Status: RESOLVED | Noted: 2023-04-28 | Resolved: 2025-02-18

## 2025-02-18 PROBLEM — M79.671 PAIN OF RIGHT HEEL: Status: RESOLVED | Noted: 2023-04-28 | Resolved: 2025-02-18

## 2025-02-18 PROBLEM — D69.6 THROMBOCYTOPENIA (CMS-HCC): Status: RESOLVED | Noted: 2023-04-28 | Resolved: 2025-02-18

## 2025-02-18 PROBLEM — K80.20 GALL STONES: Status: RESOLVED | Noted: 2023-04-28 | Resolved: 2025-02-18

## 2025-02-18 PROBLEM — G31.84 MILD COGNITIVE IMPAIRMENT: Status: RESOLVED | Noted: 2023-04-28 | Resolved: 2025-02-18

## 2025-02-18 PROBLEM — M65.28 CALCIFIC ACHILLES TENDINITIS: Status: RESOLVED | Noted: 2023-04-28 | Resolved: 2025-02-18

## 2025-02-21 ENCOUNTER — APPOINTMENT (OUTPATIENT)
Dept: PRIMARY CARE | Facility: CLINIC | Age: 80
End: 2025-02-21
Payer: MEDICARE

## 2025-02-21 DIAGNOSIS — N18.32 TYPE 2 DIABETES MELLITUS WITH STAGE 3B CHRONIC KIDNEY DISEASE, WITHOUT LONG-TERM CURRENT USE OF INSULIN (MULTI): Primary | ICD-10-CM

## 2025-02-21 DIAGNOSIS — R41.82 ALTERED MENTAL STATUS, UNSPECIFIED ALTERED MENTAL STATUS TYPE: ICD-10-CM

## 2025-02-21 DIAGNOSIS — E11.22 TYPE 2 DIABETES MELLITUS WITH STAGE 3B CHRONIC KIDNEY DISEASE, WITHOUT LONG-TERM CURRENT USE OF INSULIN (MULTI): Primary | ICD-10-CM

## 2025-02-21 DIAGNOSIS — R41.3 MEMORY LOSS: ICD-10-CM

## 2025-02-21 PROCEDURE — 1159F MED LIST DOCD IN RCRD: CPT | Performed by: FAMILY MEDICINE

## 2025-02-21 PROCEDURE — 1123F ACP DISCUSS/DSCN MKR DOCD: CPT | Performed by: FAMILY MEDICINE

## 2025-02-21 PROCEDURE — 99212 OFFICE O/P EST SF 10 MIN: CPT | Performed by: FAMILY MEDICINE

## 2025-02-21 NOTE — PROGRESS NOTES
,Virtual or Telephone Consent    While technically available, the patient was unable or unwilling to consent to connect via audio/video telehealth technology; therefore, I performed this visit using a real-time audio only connection between Lorri Velasquez location: home  & Jess Sullivan MD location: office .  Verbal consent was requested and obtained from Lorri Velasquez on this date, 02/23/25 for a telehealth visit.        Subjective   Patient ID: Lorri Velasquez is a 80 y.o. female who presents for Follow-up (No concerns).  HPI  Followup, review labs   Glucose 216  GFR =32    Trans Aminase  sl elevated    H/h nl.   Iron sat minimally low.  B12 nl  Review of Systems    Objective   There were no vitals taken for this visit.    Physical Exam    Assessment/Plan   Problem List Items Addressed This Visit          High    Type 2 diabetes mellitus with chronic kidney disease, without long-term current use of insulin (Multi) - Primary     Other Visit Diagnoses       Memory loss        Altered mental status, unspecified altered mental status type              DM2    - CKD function improved, on Farxiga. -   -glucose elevated .will try to get A1c added to assess .will let pt know next steps     Memory loss  - nothing on labs to explain  - I continue to think it may be related to her heart rhythm .  Enc her to see her Cardiologist . I have already reveiwed not ffrom Dr. Hair from 10/2024 .   Pt has an upcoming appt with[facility] doctor as well .    Consult  w Neurology -would like help getting in sooner.         FIDELIA Sullivan MD

## 2025-02-25 ENCOUNTER — TELEPHONE (OUTPATIENT)
Dept: PRIMARY CARE | Facility: CLINIC | Age: 80
End: 2025-02-25

## 2025-02-25 ENCOUNTER — OFFICE VISIT (OUTPATIENT)
Dept: PULMONOLOGY | Facility: HOSPITAL | Age: 80
End: 2025-02-25
Payer: MEDICARE

## 2025-02-25 VITALS
WEIGHT: 133 LBS | HEART RATE: 70 BPM | BODY MASS INDEX: 27.92 KG/M2 | HEIGHT: 58 IN | OXYGEN SATURATION: 94 % | SYSTOLIC BLOOD PRESSURE: 103 MMHG | RESPIRATION RATE: 16 BRPM | DIASTOLIC BLOOD PRESSURE: 72 MMHG

## 2025-02-25 DIAGNOSIS — J98.6 ELEVATED DIAPHRAGM: ICD-10-CM

## 2025-02-25 DIAGNOSIS — R41.82 ALTERED MENTAL STATUS, UNSPECIFIED ALTERED MENTAL STATUS TYPE: Primary | ICD-10-CM

## 2025-02-25 DIAGNOSIS — R06.02 SHORTNESS OF BREATH: Primary | ICD-10-CM

## 2025-02-25 DIAGNOSIS — I48.91 ATRIAL FIBRILLATION, UNSPECIFIED TYPE (MULTI): ICD-10-CM

## 2025-02-25 DIAGNOSIS — R41.3 MEMORY LOSS: ICD-10-CM

## 2025-02-25 PROCEDURE — 1036F TOBACCO NON-USER: CPT | Performed by: NURSE PRACTITIONER

## 2025-02-25 PROCEDURE — 99204 OFFICE O/P NEW MOD 45 MIN: CPT | Performed by: NURSE PRACTITIONER

## 2025-02-25 PROCEDURE — 1159F MED LIST DOCD IN RCRD: CPT | Performed by: NURSE PRACTITIONER

## 2025-02-25 PROCEDURE — 99214 OFFICE O/P EST MOD 30 MIN: CPT | Performed by: NURSE PRACTITIONER

## 2025-02-25 PROCEDURE — 1123F ACP DISCUSS/DSCN MKR DOCD: CPT | Performed by: NURSE PRACTITIONER

## 2025-02-25 PROCEDURE — 3074F SYST BP LT 130 MM HG: CPT | Performed by: NURSE PRACTITIONER

## 2025-02-25 PROCEDURE — 1160F RVW MEDS BY RX/DR IN RCRD: CPT | Performed by: NURSE PRACTITIONER

## 2025-02-25 PROCEDURE — 3078F DIAST BP <80 MM HG: CPT | Performed by: NURSE PRACTITIONER

## 2025-02-25 RX ORDER — ALBUTEROL SULFATE 0.83 MG/ML
3 SOLUTION RESPIRATORY (INHALATION) ONCE
OUTPATIENT
Start: 2025-02-25 | End: 2025-02-25

## 2025-02-25 RX ORDER — ALBUTEROL SULFATE 90 UG/1
4 INHALANT RESPIRATORY (INHALATION) ONCE
OUTPATIENT
Start: 2025-02-25

## 2025-02-25 ASSESSMENT — ENCOUNTER SYMPTOMS
SHORTNESS OF BREATH: 1
WHEEZING: 0
FEVER: 0
FATIGUE: 0
CHILLS: 0
COUGH: 0
RHINORRHEA: 0
UNEXPECTED WEIGHT CHANGE: 0

## 2025-02-25 NOTE — TELEPHONE ENCOUNTER
Please contact the office below .  I would like to see if providers at [facility] could see pt  (NEW)    Currently  she is scheduled with someone at  , but its too far out in the future.     Reason for Referral : memory change, intermittent aphasia / word finding issues .     Dr. Goss,  Dr. Hines, or/ and their nurse practitioners.  [address], [address]  [place] [address]  [phone]    Let me know the outcome  .

## 2025-02-25 NOTE — PROGRESS NOTES
Subjective   Patient ID: Lorri Velasquez is a 80 y.o. female who presents for NPV for shortness of breath.     HPI: Patient has PMH of anemia, CKD, atrial fibrillation, CAD, GERD, DMII, sick sinus syndrome, and hypothyroidism. She is referred for shortness of breath. She states that she has been short of breath on exertion for several years and when going up stairs. She states that it has not been noticeably getting worse but her significant other states she gets shortness of breath very easily doing household chores this does improve with rest. She denies any cough or wheezing. She denies sinus drainage or congestion. She has had chronic elevation of right hemidiaphragm on CXR dating back to at least 2014 in system. She has never been diagnosed with asthma and no family history of asthma or chronic lung disease. She states she smoked lightly for 2-3 years but quit over 50 years ago. She denies prior occupational exposures.     Review of Systems   Constitutional:  Negative for chills, fatigue, fever and unexpected weight change.   HENT:  Negative for congestion, postnasal drip and rhinorrhea.    Respiratory:  Positive for shortness of breath. Negative for cough (denies hemoptysis.) and wheezing.    Cardiovascular:  Negative for chest pain and leg swelling.   All other systems reviewed and are negative.      Objective   Physical Exam  Vitals reviewed.   Constitutional:       Appearance: Normal appearance.   HENT:      Head: Normocephalic.   Cardiovascular:      Rate and Rhythm: Normal rate and regular rhythm.   Pulmonary:      Effort: Pulmonary effort is normal.      Breath sounds: Normal breath sounds.   Skin:     General: Skin is warm and dry.   Neurological:      Mental Status: She is alert.         Assessment/Plan   Shortness of breath  Chronic elevation of right hemidiaphragm   Atrial fibrillation  SSS s/p PPM  CAD s/p PCI    Plan:    Patient was referred for shortness of breath on exertion. She states  this is not new for her and has been going on for several years. She gets easily short of breath with household chores but it is relieved with rest. She has no cough or wheezing. No history or family history of chronic lung disease. She does have a history of chronic elevation of the right hemidiaphragm she has known about this for several years. It dates back in our system to at least prior to 2014.     -PFTs ordered.  -CXR most recent 12/30/24 with right hemidiaphragm chronic elevation and chronic atelectasis.   -Sniff test was ordered but not done, I reordered this for her today.    Overall there is no obvious pulmonary etiology for her shortness of breath suspect it is likely due to general deconditioning and she has several cardiac etiologies. I will obtain testing and bring her back for follow up with Dr. Clemente in 3 months. I instructed patient to call sooner if needed.      Total time:  45 min.

## 2025-02-25 NOTE — PATIENT INSTRUCTIONS
Please get lung and oxygen test done.  Please get sniff test done to check your diaphragm.   Call with any questions or concerns.  Follow up with Dr. Clemente in 3 months.

## 2025-03-03 NOTE — TELEPHONE ENCOUNTER
Attempted to call office and received an answering service saying they are unable to take a message.

## 2025-03-06 NOTE — TELEPHONE ENCOUNTER
Reached out to [facility] - said they just need referral and to gia as urgent. May take a few days to call patient but they should be able to schedule within 4 weeks.

## 2025-03-17 ENCOUNTER — APPOINTMENT (OUTPATIENT)
Dept: PRIMARY CARE | Facility: CLINIC | Age: 80
End: 2025-03-17
Payer: MEDICARE

## 2025-03-18 DIAGNOSIS — R41.82 ALTERED MENTAL STATUS, UNSPECIFIED ALTERED MENTAL STATUS TYPE: Primary | ICD-10-CM

## 2025-03-19 ENCOUNTER — HOSPITAL ENCOUNTER (OUTPATIENT)
Dept: RADIOLOGY | Facility: CLINIC | Age: 80
Discharge: HOME | End: 2025-03-19
Payer: MEDICARE

## 2025-03-19 DIAGNOSIS — R41.82 ALTERED MENTAL STATUS, UNSPECIFIED ALTERED MENTAL STATUS TYPE: ICD-10-CM

## 2025-03-19 PROCEDURE — 70450 CT HEAD/BRAIN W/O DYE: CPT

## 2025-03-21 ENCOUNTER — APPOINTMENT (OUTPATIENT)
Dept: PRIMARY CARE | Facility: CLINIC | Age: 80
End: 2025-03-21
Payer: MEDICARE

## 2025-03-28 ENCOUNTER — HOSPITAL ENCOUNTER (OUTPATIENT)
Dept: RESPIRATORY THERAPY | Facility: HOSPITAL | Age: 80
Discharge: HOME | End: 2025-03-28
Payer: MEDICARE

## 2025-03-28 DIAGNOSIS — R06.02 SHORTNESS OF BREATH: ICD-10-CM

## 2025-03-28 PROCEDURE — 94640 AIRWAY INHALATION TREATMENT: CPT

## 2025-03-28 PROCEDURE — 94618 PULMONARY STRESS TESTING: CPT

## 2025-03-28 PROCEDURE — 2500000001 HC RX 250 WO HCPCS SELF ADMINISTERED DRUGS (ALT 637 FOR MEDICARE OP): Performed by: NURSE PRACTITIONER

## 2025-03-28 PROCEDURE — 94726 PLETHYSMOGRAPHY LUNG VOLUMES: CPT

## 2025-03-28 RX ORDER — ALBUTEROL SULFATE 0.83 MG/ML
3 SOLUTION RESPIRATORY (INHALATION) ONCE
Status: COMPLETED | OUTPATIENT
Start: 2025-03-28 | End: 2025-03-28

## 2025-03-28 RX ORDER — ALBUTEROL SULFATE 90 UG/1
4 INHALANT RESPIRATORY (INHALATION) ONCE
Status: COMPLETED | OUTPATIENT
Start: 2025-03-28 | End: 2025-03-28

## 2025-03-28 RX ADMIN — ALBUTEROL SULFATE 4 PUFF: 90 AEROSOL, METERED RESPIRATORY (INHALATION) at 13:39

## 2025-03-31 LAB
MGC ASCENT PFT - FEV1 - POST: 1.18
MGC ASCENT PFT - FEV1 - PRE: 1.08
MGC ASCENT PFT - FEV1 - PREDICTED: 1.61
MGC ASCENT PFT - FVC - POST: 1.49
MGC ASCENT PFT - FVC - PRE: 1.26
MGC ASCENT PFT - FVC - PREDICTED: 2.08

## 2025-04-02 ENCOUNTER — APPOINTMENT (OUTPATIENT)
Dept: PRIMARY CARE | Facility: CLINIC | Age: 80
End: 2025-04-02
Payer: MEDICARE

## 2025-04-03 ENCOUNTER — HOSPITAL ENCOUNTER (OUTPATIENT)
Dept: RADIOLOGY | Facility: HOSPITAL | Age: 80
Discharge: HOME | End: 2025-04-03
Payer: MEDICARE

## 2025-04-03 DIAGNOSIS — F03.A0 UNSPECIFIED DEMENTIA, MILD, WITHOUT BEHAVIORAL DISTURBANCE, PSYCHOTIC DISTURBANCE, MOOD DISTURBANCE, AND ANXIETY: ICD-10-CM

## 2025-04-03 PROCEDURE — 78814 PET IMAGE W/CT LMTD: CPT | Mod: PI

## 2025-04-03 PROCEDURE — 3430000001 HC RX 343 DIAGNOSTIC RADIOPHARMACEUTICALS

## 2025-04-03 PROCEDURE — A9586 FLORBETAPIR F18: HCPCS

## 2025-04-03 RX ADMIN — FLORBETAPIR F 18 11.5 MILLICURIE: 51 INJECTION, SOLUTION INTRAVENOUS at 14:17

## 2025-04-10 ENCOUNTER — APPOINTMENT (OUTPATIENT)
Dept: PRIMARY CARE | Facility: CLINIC | Age: 80
End: 2025-04-10
Payer: MEDICARE

## 2025-04-21 ENCOUNTER — TELEPHONE (OUTPATIENT)
Dept: PRIMARY CARE | Facility: CLINIC | Age: 80
End: 2025-04-21
Payer: MEDICARE

## 2025-04-21 NOTE — TELEPHONE ENCOUNTER
Pt called in stating she has arthritis and it has been flaring up again and causing pain and wants to know if she could receive a medication to help deal with the pain ? .       On call attached

## 2025-04-21 NOTE — TELEPHONE ENCOUNTER
Recommend office visit . In person or virtual .    Use a sick visit spot .    Isotretinoin Pregnancy And Lactation Text: This medication is Pregnancy Category X and is considered extremely dangerous during pregnancy. It is unknown if it is excreted in breast milk.

## 2025-04-23 ENCOUNTER — OFFICE VISIT (OUTPATIENT)
Dept: PRIMARY CARE | Facility: CLINIC | Age: 80
End: 2025-04-23
Payer: MEDICARE

## 2025-04-23 ENCOUNTER — TELEPHONE (OUTPATIENT)
Dept: PRIMARY CARE | Facility: CLINIC | Age: 80
End: 2025-04-23

## 2025-04-23 VITALS
WEIGHT: 133.8 LBS | TEMPERATURE: 97.8 F | SYSTOLIC BLOOD PRESSURE: 154 MMHG | OXYGEN SATURATION: 94 % | DIASTOLIC BLOOD PRESSURE: 85 MMHG | HEART RATE: 73 BPM | BODY MASS INDEX: 27.96 KG/M2 | RESPIRATION RATE: 14 BRPM

## 2025-04-23 DIAGNOSIS — M54.16 ACUTE RIGHT LUMBAR RADICULOPATHY: Primary | ICD-10-CM

## 2025-04-23 DIAGNOSIS — M54.31 RIGHT SCIATIC NERVE PAIN: ICD-10-CM

## 2025-04-23 DIAGNOSIS — Z95.818 PRESENCE OF WATCHMAN LEFT ATRIAL APPENDAGE CLOSURE DEVICE: ICD-10-CM

## 2025-04-23 DIAGNOSIS — R06.02 SHORTNESS OF BREATH: ICD-10-CM

## 2025-04-23 DIAGNOSIS — N18.32 TYPE 2 DIABETES MELLITUS WITH STAGE 3B CHRONIC KIDNEY DISEASE, WITHOUT LONG-TERM CURRENT USE OF INSULIN (MULTI): ICD-10-CM

## 2025-04-23 DIAGNOSIS — E11.22 TYPE 2 DIABETES MELLITUS WITH STAGE 3B CHRONIC KIDNEY DISEASE, WITHOUT LONG-TERM CURRENT USE OF INSULIN (MULTI): ICD-10-CM

## 2025-04-23 PROCEDURE — 3079F DIAST BP 80-89 MM HG: CPT | Performed by: FAMILY MEDICINE

## 2025-04-23 PROCEDURE — 99214 OFFICE O/P EST MOD 30 MIN: CPT | Performed by: FAMILY MEDICINE

## 2025-04-23 PROCEDURE — 1123F ACP DISCUSS/DSCN MKR DOCD: CPT | Performed by: FAMILY MEDICINE

## 2025-04-23 PROCEDURE — 3077F SYST BP >= 140 MM HG: CPT | Performed by: FAMILY MEDICINE

## 2025-04-23 PROCEDURE — 1160F RVW MEDS BY RX/DR IN RCRD: CPT | Performed by: FAMILY MEDICINE

## 2025-04-23 PROCEDURE — 1159F MED LIST DOCD IN RCRD: CPT | Performed by: FAMILY MEDICINE

## 2025-04-23 RX ORDER — PREDNISONE 20 MG/1
40 TABLET ORAL DAILY
Qty: 10 TABLET | Refills: 0 | Status: SHIPPED | OUTPATIENT
Start: 2025-04-23 | End: 2025-04-28

## 2025-04-23 RX ORDER — GABAPENTIN 100 MG/1
100 CAPSULE ORAL 3 TIMES DAILY
Qty: 30 CAPSULE | Refills: 1 | Status: SHIPPED | OUTPATIENT
Start: 2025-04-23 | End: 2025-10-20

## 2025-04-23 NOTE — PATIENT INSTRUCTIONS
For PAIN -  Sciatica  -  Prednisone 20 mg 5 days . AFTER that, start Gabapentin 100 mg  3 x a day.  Remember you can take more Gabapentin ,up to 300 mg at a time, if needed.  For Lungs - I will contact the Lung Specialist for you  For your Sugar/ Diabetes-  Let me know if you have AMARYL   4.  Please check your medications and send me your list  . My fax number is  [phone]    Dr. Sullivan

## 2025-04-23 NOTE — PROGRESS NOTES
Subjective   Patient ID: Lorri Velasquez is a 80 y.o. female who presents for Back Pain.  HPI  Here w Spouse    10 days  of sxs .  Noticed it right After doing PET Scan     Pain in  low back, right hip and pain in calf ,  right   .  Tingling right leg.   Moves position/ walks /   takes tylenol,  alternates with  Advil  . Heating pad .  Not getting better.  Cannot sleep in her bed,  fall asleep on couch     Also relates  - completed neurologic evaluation and dxed with Alzheimers dementia   - had watchman device placed    - completed pulmonary tests , not sure of results  /what is next     Review of Systems    Objective   /85 (BP Location: Left arm, Patient Position: Sitting, BP Cuff Size: Adult)   Pulse 73   Temp 36.6 °C (97.8 °F) (Temporal)   Resp 14   Wt 60.7 kg (133 lb 12.8 oz)   SpO2 94%   BMI 27.96 kg/m²     Physical Exam  Anxious and concerned .    No acute distress  but is holding her right back and looks in pain . Moderate to severe     SLR slightly postiive on the right   Hyperreflexic, right lower extremity   Mild weakness foot flexion, right   Back : mild pain on palpation of right paraspinous lumbosacral area   No edema.   No bruising, rash      No calf swelling / redness/ cords    Assessment/Plan   Problem List Items Addressed This Visit          High    Type 2 diabetes mellitus with chronic kidney disease, without long-term current use of insulin (Multi)     Other Visit Diagnoses         Acute right lumbar radiculopathy    -  Primary    Relevant Medications    predniSONE (Deltasone) 20 mg tablet    gabapentin (Neurontin) 100 mg capsule    Other Relevant Orders    XR lumbar spine 2-3 views      Right sciatic nerve pain          Presence of Watchman left atrial appendage closure device          Shortness of breath              Acute lumbar radiculopathy   New dxis .  Reviewed etiology ,  dxis,   eval, tx and follow up . Questions addressed .  Close interval followup regarding pain.   Discussed the titration for gabapentin .     Dyspnea.   I was able to find the PFT result ;  pts appt is in approx a month. She continues to be short of breath.  Will reach out to Pulmonary to see if they advise anything in the meantime.     A Flutter  She will followup with  cardiology     Alz Dementia  She is visibly upset by this diagnosis.  They have upcoming appt with neurology to discuss further.     She / spouse overwhelmed with the many appts and sxs she's been having.  Offered my support.  Enc them to write things down.   Med List could not be reconciled. She is now on Asa and Plavix, so I added those to list .       I printed hospital summary from [facility],  which lists medications ,   they are to check meds at home   Specifically Amaryl ,  Alprazolam , Terazosin,  Hydrochlorothiazide need siomara Sullivan MD

## 2025-04-24 RX ORDER — ASPIRIN 81 MG/1
81 TABLET ORAL DAILY
COMMUNITY

## 2025-04-24 RX ORDER — CLOPIDOGREL BISULFATE 75 MG/1
75 TABLET ORAL DAILY
COMMUNITY

## 2025-04-30 ENCOUNTER — TELEPHONE (OUTPATIENT)
Dept: PRIMARY CARE | Facility: CLINIC | Age: 80
End: 2025-04-30
Payer: MEDICARE

## 2025-04-30 NOTE — TELEPHONE ENCOUNTER
Called and spoke to patient.      Reports the prednisone was helpful.  Unfortunately she fell directly on the right buttock which is the area we are treating.    She has been applying heat to this area as well as taking Tylenol by mouth.    Gabapentin 100 mg capsules were prescribed she is taking 1 or 2 of them at a time.  She they do make her sleepy but not excessively so.  She wishes to continue this.  I offered a different product which she declined    Recommend continuing current regimen notify if symptoms do not continue to improve ; pt hadno further questions at this time.

## 2025-04-30 NOTE — TELEPHONE ENCOUNTER
Patient called and stated that she was in to see pcp last week for her sciata pain but it keeps her zoned out, then she fell Sunday on her left side so it seem to flare up the sciatia more, also along with the medication the pcp gave she is also taking tylenol and motrin  in the background said pcp told the wife she can take one in the morning, one in the evening and three at night, but wife said the pills make her sleepy but does not help the pain. Wants to know what else can be done.

## 2025-05-02 ENCOUNTER — TELEPHONE (OUTPATIENT)
Dept: PRIMARY CARE | Facility: CLINIC | Age: 80
End: 2025-05-02
Payer: MEDICARE

## 2025-05-02 DIAGNOSIS — M54.30 SCIATICA, UNSPECIFIED LATERALITY: Primary | ICD-10-CM

## 2025-05-02 RX ORDER — PREDNISONE 10 MG/1
TABLET ORAL
Qty: 21 TABLET | Refills: 0 | Status: SHIPPED | OUTPATIENT
Start: 2025-05-02

## 2025-05-02 RX ORDER — TRAMADOL HYDROCHLORIDE 50 MG/1
50 TABLET ORAL EVERY 8 HOURS PRN
Qty: 10 TABLET | Refills: 0 | Status: SHIPPED | OUTPATIENT
Start: 2025-05-02 | End: 2025-05-07

## 2025-05-02 NOTE — TELEPHONE ENCOUNTER
Spoke to pt .  Not having  ur incontinence except in morning, can't get to bathroom soon enough/ fast enough bc back hurts.   No fever.   No loss stool continence.     Sciatica, severe  Additional pred taper.   Medication for pain ordered  Continue gabapentin  Discussed reasons to seek immediate medical attention.

## 2025-05-02 NOTE — TELEPHONE ENCOUNTER
Lorri called in wanting you to know that her sciatic pain is unbearable and is requesting medication. Also, she stated the the urine incontinence has become worse.  She is schedule to see you 5/21/25.       Ultimately, she wants a new script of Prednisone and something stronger for pain.

## 2025-05-11 DIAGNOSIS — G25.0 TREMOR, ESSENTIAL: ICD-10-CM

## 2025-05-11 DIAGNOSIS — F34.1 PERSISTENT DEPRESSIVE DISORDER: ICD-10-CM

## 2025-05-11 DIAGNOSIS — I10 BENIGN ESSENTIAL HYPERTENSION: ICD-10-CM

## 2025-05-11 DIAGNOSIS — F41.9 ANXIETY: ICD-10-CM

## 2025-05-12 ENCOUNTER — TELEPHONE (OUTPATIENT)
Dept: PRIMARY CARE | Facility: CLINIC | Age: 80
End: 2025-05-12

## 2025-05-12 DIAGNOSIS — E03.9 ACQUIRED HYPOTHYROIDISM: ICD-10-CM

## 2025-05-12 RX ORDER — PROPRANOLOL HYDROCHLORIDE 10 MG/1
TABLET ORAL
Qty: 270 TABLET | Refills: 1 | Status: SHIPPED | OUTPATIENT
Start: 2025-05-12

## 2025-05-12 RX ORDER — SPIRONOLACTONE 25 MG/1
25 TABLET ORAL DAILY
Qty: 90 TABLET | Refills: 1 | Status: SHIPPED | OUTPATIENT
Start: 2025-05-12

## 2025-05-12 RX ORDER — ESCITALOPRAM OXALATE 20 MG/1
20 TABLET ORAL DAILY
Qty: 90 TABLET | Refills: 1 | Status: SHIPPED | OUTPATIENT
Start: 2025-05-12

## 2025-05-12 RX ORDER — LEVOTHYROXINE SODIUM 88 UG/1
88 TABLET ORAL DAILY
Qty: 90 TABLET | Refills: 1 | Status: SHIPPED | OUTPATIENT
Start: 2025-05-12

## 2025-05-15 NOTE — TELEPHONE ENCOUNTER
Pt called in stating she's having septic pain and you've been healing her deal with in the few past months and states after finishing the medication prescribed she's still in pain and wants to know what can be done from here  ?

## 2025-05-21 ENCOUNTER — OFFICE VISIT (OUTPATIENT)
Dept: PRIMARY CARE | Facility: CLINIC | Age: 80
End: 2025-05-21
Payer: MEDICARE

## 2025-05-21 ENCOUNTER — APPOINTMENT (OUTPATIENT)
Dept: PULMONOLOGY | Facility: HOSPITAL | Age: 80
End: 2025-05-21
Payer: MEDICARE

## 2025-05-21 ENCOUNTER — APPOINTMENT (OUTPATIENT)
Dept: PRIMARY CARE | Facility: CLINIC | Age: 80
End: 2025-05-21
Payer: MEDICARE

## 2025-05-21 VITALS
SYSTOLIC BLOOD PRESSURE: 152 MMHG | WEIGHT: 125 LBS | DIASTOLIC BLOOD PRESSURE: 78 MMHG | RESPIRATION RATE: 14 BRPM | HEART RATE: 73 BPM | OXYGEN SATURATION: 94 % | TEMPERATURE: 97.9 F | BODY MASS INDEX: 26.13 KG/M2

## 2025-05-21 DIAGNOSIS — M54.16 ACUTE RIGHT LUMBAR RADICULOPATHY: ICD-10-CM

## 2025-05-21 DIAGNOSIS — N18.32 TYPE 2 DIABETES MELLITUS WITH STAGE 3B CHRONIC KIDNEY DISEASE, WITHOUT LONG-TERM CURRENT USE OF INSULIN (MULTI): ICD-10-CM

## 2025-05-21 DIAGNOSIS — E78.2 MIXED HYPERLIPIDEMIA: ICD-10-CM

## 2025-05-21 DIAGNOSIS — R35.0 URINARY FREQUENCY: ICD-10-CM

## 2025-05-21 DIAGNOSIS — E11.22 TYPE 2 DIABETES MELLITUS WITH STAGE 3B CHRONIC KIDNEY DISEASE, WITHOUT LONG-TERM CURRENT USE OF INSULIN (MULTI): ICD-10-CM

## 2025-05-21 DIAGNOSIS — G30.9 ALZHEIMER'S DEMENTIA WITH MOOD DISTURBANCE, UNSPECIFIED DEMENTIA SEVERITY, UNSPECIFIED TIMING OF DEMENTIA ONSET (MULTI): ICD-10-CM

## 2025-05-21 DIAGNOSIS — F02.83 ALZHEIMER'S DEMENTIA WITH MOOD DISTURBANCE, UNSPECIFIED DEMENTIA SEVERITY, UNSPECIFIED TIMING OF DEMENTIA ONSET (MULTI): ICD-10-CM

## 2025-05-21 DIAGNOSIS — M54.30 SCIATICA, UNSPECIFIED LATERALITY: Primary | ICD-10-CM

## 2025-05-21 PROBLEM — F02.80 ALZHEIMER DEMENTIA: Status: ACTIVE | Noted: 2025-05-21

## 2025-05-21 LAB
POC APPEARANCE, URINE: ABNORMAL
POC BILIRUBIN, URINE: NEGATIVE
POC BLOOD, URINE: NEGATIVE
POC COLOR, URINE: YELLOW
POC GLUCOSE, URINE: ABNORMAL MG/DL
POC KETONES, URINE: NEGATIVE MG/DL
POC LEUKOCYTES, URINE: NEGATIVE
POC NITRITE,URINE: NEGATIVE
POC PH, URINE: 5.5 PH
POC PROTEIN, URINE: NEGATIVE MG/DL
POC SPECIFIC GRAVITY, URINE: 1.01
POC UROBILINOGEN, URINE: 0.2 EU/DL

## 2025-05-21 PROCEDURE — 81003 URINALYSIS AUTO W/O SCOPE: CPT | Performed by: FAMILY MEDICINE

## 2025-05-21 PROCEDURE — 1159F MED LIST DOCD IN RCRD: CPT | Performed by: FAMILY MEDICINE

## 2025-05-21 PROCEDURE — 3078F DIAST BP <80 MM HG: CPT | Performed by: FAMILY MEDICINE

## 2025-05-21 PROCEDURE — 3077F SYST BP >= 140 MM HG: CPT | Performed by: FAMILY MEDICINE

## 2025-05-21 PROCEDURE — 99214 OFFICE O/P EST MOD 30 MIN: CPT | Performed by: FAMILY MEDICINE

## 2025-05-21 RX ORDER — GLIMEPIRIDE 1 MG/1
1 TABLET ORAL
Qty: 100 TABLET | Refills: 1 | Status: SHIPPED | OUTPATIENT
Start: 2025-05-21 | End: 2025-08-29

## 2025-05-21 RX ORDER — ATORVASTATIN CALCIUM 40 MG/1
40 TABLET, FILM COATED ORAL DAILY
Qty: 90 TABLET | Refills: 1 | Status: SHIPPED | OUTPATIENT
Start: 2025-05-21

## 2025-05-21 RX ORDER — DONEPEZIL HYDROCHLORIDE 10 MG/1
10 TABLET, FILM COATED ORAL NIGHTLY
COMMUNITY

## 2025-05-21 NOTE — PROGRESS NOTES
Subjective   Patient ID: Lorri Velasquez is a 80 y.o. female who presents for Back Pain, Dementia, and Fatigue.  HPI    ---Followup of back pain .   Was a virtual, pt / spouse state they were not aware . Xray -  no done yet .  Pred twice, second course did not help as much.   Sitting in chair hurts  Sitting [place] citizen style is comofrtable.   Laying on right side hurts  Sleep disrupted.    NEW C/O     Nausea / GI sxs  started just on her way in to office, in the car (45 min [address]). No emesis . No abd pain. No fever.  States not feeling too great .      Urinary urgency ,  not a new issue ; bothersome  ; no blood inurine, no urinary pain  Urine incontinence if waits too long     States never had any serious med probs . And feeling depressed since dxis of Alzheimers.   Sleep disrupted. No appetite.  Used to be the person everyone in family came to for help,  she is withdrawing from that , can't handle all of it.      Hot flashes  couple months , new symptom  Question about Atorvastatin  ,whether she is supposed to be on it or not.    MEDICATION       aspirin 81 mg EC tablet Take 1 tablet (81 mg) by mouth once daily.    dapagliflozin propanediol (Farxiga) 5 mg Take 1 tablet (5 mg) by mouth once daily. For diabetes and kidney disease    donepezil (Aricept) 5mg tablet Take 1 tablet (5mg) by mouth once daily     escitalopram (Lexapro) 20 mg tablet TAKE 1 TABLET BY MOUTH EVERY DAY         levothyroxine (Synthroid, Levoxyl) 88 mcg tablet TAKE 1 TABLET BY MOUTH ONCE DAILY.    pantoprazole (ProtoNix) 40 mg EC tablet TAKE 1 TABLET BY MOUTH EVERY DAY    propranolol (Inderal) 10 mg tablet 1 TABLET BY MOUTH 2-3 TIMES DAILY FOR TREMOR    spironolactone (Aldactone) 25 mg tablet TAKE 1 TABLET BY MOUTH EVERY DAY                clopidogrel (Plavix) 75 mg tablet Take 1 tablet (75 mg) by mouth once daily.          NOT on diuretics ,  Hytrin ,  tramadol, Gabapentin   She brought all her bottleswiht her.   Review of Systems  Yesterday,   blood in left eye   Objective   /78 (BP Location: Left arm, Patient Position: Sitting, BP Cuff Size: Adult)   Pulse 73   Temp 36.6 °C (97.9 °F) (Temporal)   Resp 14   Wt 56.7 kg (125 lb)   SpO2 94%   BMI 26.13 kg/m²     Physical Exam    Alert,  depressed,  and anxious .  No icterus.  Mild pallor.   Left subconjunctival hemorrhage  upper inner eye    CV regular  .   Lungs clear, no rale, no wheze   Abd soft,  active BS no pain on palpation     Back : mild pain over lumbar parasp muscles . Worse with Active rom / flex/ extend   Slr neg   Reflex 2 /4 bilaterally   Strength  5/5/ bilaterally        Assessment/Plan   Problem List Items Addressed This Visit          High    Type 2 diabetes mellitus with chronic kidney disease, without long-term current use of insulin (Multi)    Relevant Medications    glimepiride (AmaryL) 1 mg tablet    Other Relevant Orders    Referral to Home Care       Medium    Alzheimer dementia    Hyperlipidemia    Relevant Medications    atorvastatin (Lipitor) 40 mg tablet    Other Relevant Orders    Referral to Home Care     Other Visit Diagnoses         Sciatica, unspecified laterality    -  Primary    Relevant Orders    XR lumbar spine complete 4+ views      Acute right lumbar radiculopathy        Relevant Orders    XR lumbar spine complete 4+ views      Urinary frequency        Relevant Orders    POCT UA Automated manually resulted (Completed)            Sciatica  - With primarily right sided radiculopathy symptoms at onset, currently pain is bilateral.  Moderate interference in her day-to-day function as well as her sleep  - Prednisone seem to help with symptoms, gabapentin did not make a significant difference.  I do not wish to add back any opiate or opiate like products because of her dementia.  - Asked patient to get x-rays done additional treatment and follow-up pending those results    Urinary urgency with some incontinence  - Blood sugar present in the UA, will add  medication for blood sugar control Amaryl was prescribed continue to follow-up at next routine visit    Nausea  - No acute abdominal findings, episodic.  This may be related to anxiety and depression.  Will continue to monitor  Hyperlipidemia  - Advised patient to get back on Lipitor.    Alzheimer's dementia with depression  Currently on SSRI for mood.  Recommend counseling.  Patient is open to this.  I will check with our therapist online resources close to the patient's home  A significant amount of time was spent discussing medications and reconciling medications.  There seems to be knowledge In terms of Alzheimer's dementia, diagnosis, supports.  Her ongoing pain, diabetes type 2 and Alzheimer's with depression in my opinion warrants need for an in-home assessment, physical therapy for her back social work assessment, any behavioral supports they can provide.  I told the patient and spouse I would like to add another medication called mirtazapine but I want to wait until she has started the atorvastatin and Amaryl.  Donepezil is also recent medication she is getting used to.      Followup   2 wk  FIDELIA Sullivan MD

## 2025-05-21 NOTE — PATIENT INSTRUCTIONS
Atorvastatin 40 mg once a day ,  prescription ordered.    Amaryl 1 mg once a day  , prescription ordered.     Home Care  Ordered    - I am hoping they can do Physical Therapy  for your back   -I am hoping they can help with medication management  and depression .     Referral to Therapy  - I will see if our therapist can help connect you with  a  therapist closer to home.     I would like to try you on Mirtazipine  ;  but I want to wait a couple weeks.   I would like you get the back xrays done .     Dr. Sullivan       Medication list :       aspirin 81 mg EC tablet Take 1 tablet (81 mg) by mouth once daily.    dapagliflozin propanediol (Farxiga) 5 mg Take 1 tablet (5 mg) by mouth once daily. For diabetes and kidney disease    donepezil (Aricept) 5mg tablet Take 1 tablet (5mg) by mouth once daily     escitalopram (Lexapro) 20 mg tablet TAKE 1 TABLET BY MOUTH EVERY DAY         levothyroxine (Synthroid, Levoxyl) 88 mcg tablet TAKE 1 TABLET BY MOUTH ONCE DAILY.    pantoprazole (ProtoNix) 40 mg EC tablet TAKE 1 TABLET BY MOUTH EVERY DAY    propranolol (Inderal) 10 mg tablet 1 TABLET BY MOUTH 2-3 TIMES DAILY FOR TREMOR    spironolactone (Aldactone) 25 mg tablet TAKE 1 TABLET BY MOUTH EVERY DAY         atorvastatin (Lipitor) 40 mg tablet Take 1 tablet (40 mg) by mouth once daily. as directed    clopidogrel (Plavix) 75 mg tablet Take 1 tablet (75 mg) by mouth once daily.    glimepiride (AmaryL) 1 mg tablet Take 1 tablet (1 mg) by mouth once daily in the morning. Take before meals.

## 2025-05-22 ENCOUNTER — TELEPHONE (OUTPATIENT)
Dept: HOME HEALTH SERVICES | Facility: HOME HEALTH | Age: 80
End: 2025-05-22
Payer: MEDICARE

## 2025-05-22 NOTE — TELEPHONE ENCOUNTER
Hello,       [facility] received your referral for Lorri Velasquez, In order for us to move forward with processing the referral, please include a review of systems or physical exam to visit note & complete note for review dated on 5/21/25 as required by CMS.     Also. [facility] does not provide  Psychiatry Services, Please update/edit to remove if patient need Psychiatry Services please refer to another agency     Per policy, we will close the referral if required documentation is not submitted by 5/24/25.    Ursula Luna LPN  Ambulatory I    [facility]  [address] [place] [address]  [phone]

## 2025-05-23 ENCOUNTER — HOME HEALTH ADMISSION (OUTPATIENT)
Dept: HOME HEALTH SERVICES | Facility: HOME HEALTH | Age: 80
End: 2025-05-23
Payer: MEDICARE

## 2025-05-23 ENCOUNTER — DOCUMENTATION (OUTPATIENT)
Dept: HOME HEALTH SERVICES | Facility: HOME HEALTH | Age: 80
End: 2025-05-23
Payer: MEDICARE

## 2025-05-23 NOTE — HH CARE COORDINATION
Home Care received a Referral for Nursing, Physical Therapy, and Medical Social Work. We have processed the referral for a Start of Care on 5/24-5/25/25 .     If you have any questions or concerns, please feel free to contact us at [phone]. Follow the prompts, enter your five digit zip code, and you will be directed to your care team on [facility].

## 2025-05-27 ENCOUNTER — HOSPITAL ENCOUNTER (OUTPATIENT)
Dept: RADIOLOGY | Facility: CLINIC | Age: 80
Discharge: HOME | End: 2025-05-27
Payer: MEDICARE

## 2025-05-27 ENCOUNTER — PATIENT MESSAGE (OUTPATIENT)
Dept: PRIMARY CARE | Facility: CLINIC | Age: 80
End: 2025-05-27
Payer: MEDICARE

## 2025-05-27 DIAGNOSIS — M54.16 ACUTE RIGHT LUMBAR RADICULOPATHY: ICD-10-CM

## 2025-05-27 DIAGNOSIS — M54.30 SCIATICA, UNSPECIFIED LATERALITY: ICD-10-CM

## 2025-05-27 PROCEDURE — 72110 X-RAY EXAM L-2 SPINE 4/>VWS: CPT | Performed by: STUDENT IN AN ORGANIZED HEALTH CARE EDUCATION/TRAINING PROGRAM

## 2025-05-27 PROCEDURE — 72110 X-RAY EXAM L-2 SPINE 4/>VWS: CPT

## 2025-05-27 NOTE — PROGRESS NOTES
CoCM: This writer reached out to patient on 5/27/25 via phone call to discuss counseling resources. This writer followed up with patient on 5/27/25 via Patient Portal Message to provide specific resources, and to provide contact information should patient need additional support.   --  Claudia, Behavioral Health Care Manager

## 2025-05-28 ENCOUNTER — TELEPHONE (OUTPATIENT)
Dept: PRIMARY CARE | Facility: CLINIC | Age: 80
End: 2025-05-28
Payer: MEDICARE

## 2025-05-28 DIAGNOSIS — M54.30 SCIATICA, UNSPECIFIED LATERALITY: Primary | ICD-10-CM

## 2025-05-28 DIAGNOSIS — S32.020S CLOSED COMPRESSION FRACTURE OF L2 LUMBAR VERTEBRA, SEQUELA: ICD-10-CM

## 2025-05-28 RX ORDER — GABAPENTIN 300 MG/1
300 CAPSULE ORAL 3 TIMES DAILY
Qty: 90 CAPSULE | Refills: 0 | Status: SHIPPED | OUTPATIENT
Start: 2025-05-28 | End: 2025-05-30 | Stop reason: SINTOL

## 2025-05-28 NOTE — TELEPHONE ENCOUNTER
Pt called in stating she's been dealing with sciatica pain and wants to know can she receive something to help with the pain without having to come back in office again

## 2025-05-28 NOTE — TELEPHONE ENCOUNTER
Received alert about abnl xray  .  Compression Fx. , unknown date .  Degen changes, spondylosis .     Called and spoke to pt .  Pain is mostly right side of back and down leg .     In the past I have prescribed  prednisone (2 courses)  Gabapentin, Tramadol (goofy)     Tylenol  1000 mg ,  every 6 hr  Ibuprofen 400 mg    sometimes , instead of Tylenol         Gabapentin 300 mg ,  3 times a day  Tylenol  limited to 1000 mg  3 times a day   Ibuprofen  400 mg up to 3 times a day .    Just started home health care.  PT supposed to come out Tomorrow .   Will let PT know    Pt to call if pain is not improving.   I hesitate to add any opiate due to her dementia.  She's not had success with  patch (Lidocaine) ,keeps coming off.

## 2025-05-30 ENCOUNTER — HOME CARE VISIT (OUTPATIENT)
Dept: HOME HEALTH SERVICES | Facility: HOME HEALTH | Age: 80
End: 2025-05-30

## 2025-05-30 ENCOUNTER — TELEPHONE (OUTPATIENT)
Dept: PRIMARY CARE | Facility: CLINIC | Age: 80
End: 2025-05-30
Payer: MEDICARE

## 2025-05-30 DIAGNOSIS — M54.16 ACUTE RIGHT LUMBAR RADICULOPATHY: ICD-10-CM

## 2025-05-30 DIAGNOSIS — S32.020S CLOSED COMPRESSION FRACTURE OF L2 LUMBAR VERTEBRA, SEQUELA: Primary | ICD-10-CM

## 2025-05-30 RX ORDER — GABAPENTIN 100 MG/1
CAPSULE ORAL
Qty: 90 CAPSULE | Refills: 1 | Status: SHIPPED | OUTPATIENT
Start: 2025-05-30

## 2025-05-30 NOTE — TELEPHONE ENCOUNTER
I spoke to pt about pain medicatio n(Gabapentin) . States she made appt with a spine doctor for next week.   I will close this encounter

## 2025-05-30 NOTE — TELEPHONE ENCOUNTER
Patient friend (Kushal) called in and said the patient had an appt on 5.21.25. Patient started Gabapentin 300 mg on 5.29.25 and the patient stated that the med is making her go crazy, can't function and dizzy, but it stop the pain. Patient wanted to know if she can take a lower dose or can get a different medication?  Please call back at [phone]    Patient also wanted to let the PCP know that she made an appt with the spine doctor next week.     Please and thank you.

## 2025-06-02 ENCOUNTER — APPOINTMENT (OUTPATIENT)
Facility: CLINIC | Age: 80
End: 2025-06-02
Payer: MEDICARE

## 2025-06-04 ENCOUNTER — HOME CARE VISIT (OUTPATIENT)
Dept: HOME HEALTH SERVICES | Facility: HOME HEALTH | Age: 80
End: 2025-06-04
Payer: MEDICARE

## 2025-06-04 VITALS — DIASTOLIC BLOOD PRESSURE: 66 MMHG | SYSTOLIC BLOOD PRESSURE: 110 MMHG | OXYGEN SATURATION: 97 % | HEART RATE: 71 BPM

## 2025-06-04 PROCEDURE — 169592 NO-PAY CLAIM PROCEDURE

## 2025-06-04 PROCEDURE — G0151 HHCP-SERV OF PT,EA 15 MIN: HCPCS | Mod: HHH

## 2025-06-04 ASSESSMENT — ENCOUNTER SYMPTOMS
PERSON REPORTING PAIN: PATIENT
PAIN: 1
PAIN LOCATION - PAIN SEVERITY: 7/10
SUBJECTIVE PAIN PROGRESSION: WAXING AND WANING
PAIN LOCATION: BACK
PAIN SEVERITY GOAL: 7/10
HIGHEST PAIN SEVERITY IN PAST 24 HOURS: 10/10

## 2025-06-04 ASSESSMENT — ACTIVITIES OF DAILY LIVING (ADL)
OASIS_M1830: 03
ENTERING_EXITING_HOME: ONE PERSON

## 2025-06-06 ENCOUNTER — HOME CARE VISIT (OUTPATIENT)
Dept: HOME HEALTH SERVICES | Facility: HOME HEALTH | Age: 80
End: 2025-06-06
Payer: MEDICARE

## 2025-06-07 DIAGNOSIS — K21.00 GASTROESOPHAGEAL REFLUX DISEASE WITH ESOPHAGITIS WITHOUT HEMORRHAGE: ICD-10-CM

## 2025-06-09 ENCOUNTER — APPOINTMENT (OUTPATIENT)
Dept: PRIMARY CARE | Facility: CLINIC | Age: 80
End: 2025-06-09
Payer: MEDICARE

## 2025-06-09 RX ORDER — PANTOPRAZOLE SODIUM 40 MG/1
40 TABLET, DELAYED RELEASE ORAL DAILY
Qty: 90 TABLET | Refills: 1 | Status: SHIPPED | OUTPATIENT
Start: 2025-06-09

## 2025-06-10 ENCOUNTER — HOME CARE VISIT (OUTPATIENT)
Dept: HOME HEALTH SERVICES | Facility: HOME HEALTH | Age: 80
End: 2025-06-10
Payer: MEDICARE

## 2025-06-13 ENCOUNTER — HOME CARE VISIT (OUTPATIENT)
Dept: HOME HEALTH SERVICES | Facility: HOME HEALTH | Age: 80
End: 2025-06-13
Payer: MEDICARE

## 2025-06-18 ENCOUNTER — TELEPHONE (OUTPATIENT)
Dept: PRIMARY CARE | Facility: CLINIC | Age: 80
End: 2025-06-18

## 2025-06-18 DIAGNOSIS — F41.1 GENERALIZED ANXIETY DISORDER: Primary | ICD-10-CM

## 2025-06-18 RX ORDER — MIRTAZAPINE 15 MG/1
15 TABLET, FILM COATED ORAL NIGHTLY
Qty: 30 TABLET | Refills: 2 | Status: SHIPPED | OUTPATIENT
Start: 2025-06-18 | End: 2025-07-11

## 2025-06-18 NOTE — TELEPHONE ENCOUNTER
Recommend Mirtazipine,  taken at night.  This will help her sleep better and help cut back on her nervousness.   Instruct pt to take the med nightly .

## 2025-06-18 NOTE — TELEPHONE ENCOUNTER
Patient called and stated that she went to see her back doctor at [facility] doctor pranav a week ago he ordered her a mri so that she could get a cortisone shot did put her on a prednisone pack she did take it but she still in pain. Stated that she has alzheimer's and the medication she taking for it makes her very anxious and wants to know can you give her something to take for when she gets anxious medication is Donepezil 10 mg that she takes

## 2025-06-23 ENCOUNTER — TELEPHONE (OUTPATIENT)
Dept: PRIMARY CARE | Facility: CLINIC | Age: 80
End: 2025-06-23

## 2025-06-23 NOTE — TELEPHONE ENCOUNTER
Patient called in and wanting to let the PCP know that she stop taking the glimepiride 1 mg and the Escitalopram 20 mg on 6.21.25. Patient states that she is already taking other medications for the same things. Patient also wanted to know if she could only take 1/2 tablet of mirtazapine 15 mg. Patient also wanted to let the PCP know she increased taking the propranolol 10 mg to 3 in the morning and 3 at night and it has been helping her with the tremors.         Please and thank you.

## 2025-06-23 NOTE — TELEPHONE ENCOUNTER
Inform pt,  it is okay to take Half of the Mirtazipine 15 mg,     Any further questions, let me know. Thx.

## 2025-07-09 ENCOUNTER — APPOINTMENT (OUTPATIENT)
Dept: NEUROLOGY | Facility: HOSPITAL | Age: 80
End: 2025-07-09
Payer: MEDICARE

## 2025-07-11 ENCOUNTER — APPOINTMENT (OUTPATIENT)
Dept: PRIMARY CARE | Facility: CLINIC | Age: 80
End: 2025-07-11
Payer: MEDICARE

## 2025-07-11 DIAGNOSIS — F41.1 GENERALIZED ANXIETY DISORDER: ICD-10-CM

## 2025-07-11 RX ORDER — MIRTAZAPINE 15 MG/1
15 TABLET, FILM COATED ORAL NIGHTLY
Qty: 90 TABLET | Refills: 0 | Status: SHIPPED | OUTPATIENT
Start: 2025-07-11 | End: 2025-10-09

## 2025-07-14 ENCOUNTER — TELEPHONE (OUTPATIENT)
Dept: PRIMARY CARE | Facility: CLINIC | Age: 80
End: 2025-07-14
Payer: MEDICARE

## 2025-07-14 NOTE — TELEPHONE ENCOUNTER
Called pt and gave her the message from Dr Loza who is on call advised her if the medication change that is causing the problem with her pain by the neurologist that she needs to contact the neurologist to discuss, Dr Loza is not able to change a med that was done by another specialist. Pt voiced understanding and thankful for call. DANIELE 7-14-25

## 2025-07-14 NOTE — TELEPHONE ENCOUNTER
If the change in medication that is causing the problem was made by the neurologist - she needs to contact the neurologist to discuss. I am not able to change something that was done by another specialist.

## 2025-07-14 NOTE — TELEPHONE ENCOUNTER
Patient called in said that her neurologist had cut her gabapentin down to 1 pill 3 times a day. She said that is not working  she is in excruciating pain and can't get out of bed. She is going to be getting a Cortizone shot on the 24th of this month, but needs something in the mean time. She knows that her doctor is not in the office right now.

## 2025-07-18 ENCOUNTER — TELEPHONE (OUTPATIENT)
Dept: PRIMARY CARE | Facility: CLINIC | Age: 80
End: 2025-07-18
Payer: MEDICARE

## 2025-07-18 DIAGNOSIS — S32.020S CLOSED COMPRESSION FRACTURE OF L2 LUMBAR VERTEBRA, SEQUELA: ICD-10-CM

## 2025-07-18 DIAGNOSIS — M54.16 ACUTE RIGHT LUMBAR RADICULOPATHY: ICD-10-CM

## 2025-07-18 NOTE — TELEPHONE ENCOUNTER
Patient called in said that she is completely out of her gabapentin (Neurontin) 100 mg capsule [[id number]]  please send to the [facility] in [place]    Please call when sent over

## 2025-07-21 DIAGNOSIS — E11.21 DIABETIC NEPHROPATHY ASSOCIATED WITH TYPE 2 DIABETES MELLITUS: ICD-10-CM

## 2025-07-21 DIAGNOSIS — M54.16 ACUTE RIGHT LUMBAR RADICULOPATHY: ICD-10-CM

## 2025-07-21 DIAGNOSIS — S32.020S CLOSED COMPRESSION FRACTURE OF L2 LUMBAR VERTEBRA, SEQUELA: ICD-10-CM

## 2025-07-21 DIAGNOSIS — N18.4 CKD (CHRONIC KIDNEY DISEASE) STAGE 4, GFR 15-29 ML/MIN (MULTI): ICD-10-CM

## 2025-07-21 RX ORDER — GABAPENTIN 100 MG/1
CAPSULE ORAL
Qty: 90 CAPSULE | Refills: 0 | Status: SHIPPED | OUTPATIENT
Start: 2025-07-21 | End: 2025-07-25 | Stop reason: ALTCHOICE

## 2025-07-21 RX ORDER — GABAPENTIN 100 MG/1
CAPSULE ORAL
Qty: 90 CAPSULE | Refills: 1 | OUTPATIENT
Start: 2025-07-21

## 2025-07-22 RX ORDER — DAPAGLIFLOZIN 5 MG/1
TABLET, FILM COATED ORAL
Qty: 90 TABLET | Refills: 1 | Status: SHIPPED | OUTPATIENT
Start: 2025-07-22

## 2025-07-25 ENCOUNTER — APPOINTMENT (OUTPATIENT)
Dept: PRIMARY CARE | Facility: CLINIC | Age: 80
End: 2025-07-25
Payer: MEDICARE

## 2025-07-25 VITALS
BODY MASS INDEX: 26.88 KG/M2 | WEIGHT: 128.6 LBS | TEMPERATURE: 97.4 F | RESPIRATION RATE: 14 BRPM | HEART RATE: 79 BPM | OXYGEN SATURATION: 94 % | DIASTOLIC BLOOD PRESSURE: 63 MMHG | SYSTOLIC BLOOD PRESSURE: 97 MMHG

## 2025-07-25 DIAGNOSIS — N18.31 STAGE 3A CHRONIC KIDNEY DISEASE (MULTI): Chronic | ICD-10-CM

## 2025-07-25 DIAGNOSIS — E03.9 HYPOTHYROIDISM, UNSPECIFIED TYPE: ICD-10-CM

## 2025-07-25 DIAGNOSIS — M54.30 SCIATICA, UNSPECIFIED LATERALITY: ICD-10-CM

## 2025-07-25 DIAGNOSIS — E11.22 TYPE 2 DIABETES MELLITUS WITH STAGE 3B CHRONIC KIDNEY DISEASE, WITHOUT LONG-TERM CURRENT USE OF INSULIN (MULTI): ICD-10-CM

## 2025-07-25 DIAGNOSIS — I10 PRIMARY HYPERTENSION: ICD-10-CM

## 2025-07-25 DIAGNOSIS — Z95.818 PRESENCE OF WATCHMAN LEFT ATRIAL APPENDAGE CLOSURE DEVICE: ICD-10-CM

## 2025-07-25 DIAGNOSIS — N18.32 TYPE 2 DIABETES MELLITUS WITH STAGE 3B CHRONIC KIDNEY DISEASE, WITHOUT LONG-TERM CURRENT USE OF INSULIN (MULTI): ICD-10-CM

## 2025-07-25 DIAGNOSIS — S32.020S CLOSED COMPRESSION FRACTURE OF L2 LUMBAR VERTEBRA, SEQUELA: Primary | ICD-10-CM

## 2025-07-25 PROBLEM — G47.00 INSOMNIA: Status: ACTIVE | Noted: 2025-07-19

## 2025-07-25 ASSESSMENT — PATIENT HEALTH QUESTIONNAIRE - PHQ9
8. MOVING OR SPEAKING SO SLOWLY THAT OTHER PEOPLE COULD HAVE NOTICED. OR THE OPPOSITE, BEING SO FIGETY OR RESTLESS THAT YOU HAVE BEEN MOVING AROUND A LOT MORE THAN USUAL: NOT AT ALL
7. TROUBLE CONCENTRATING ON THINGS, SUCH AS READING THE NEWSPAPER OR WATCHING TELEVISION: MORE THAN HALF THE DAYS
3. TROUBLE FALLING OR STAYING ASLEEP OR SLEEPING TOO MUCH: MORE THAN HALF THE DAYS
5. POOR APPETITE OR OVEREATING: MORE THAN HALF THE DAYS
SUM OF ALL RESPONSES TO PHQ QUESTIONS 1-9: 14
1. LITTLE INTEREST OR PLEASURE IN DOING THINGS: MORE THAN HALF THE DAYS
4. FEELING TIRED OR HAVING LITTLE ENERGY: MORE THAN HALF THE DAYS
6. FEELING BAD ABOUT YOURSELF - OR THAT YOU ARE A FAILURE OR HAVE LET YOURSELF OR YOUR FAMILY DOWN: MORE THAN HALF THE DAYS
9. THOUGHTS THAT YOU WOULD BE BETTER OFF DEAD, OR OF HURTING YOURSELF: NOT AT ALL
2. FEELING DOWN, DEPRESSED OR HOPELESS: MORE THAN HALF THE DAYS
SUM OF ALL RESPONSES TO PHQ9 QUESTIONS 1 AND 2: 4

## 2025-07-25 ASSESSMENT — ANXIETY QUESTIONNAIRES
5. BEING SO RESTLESS THAT IT IS HARD TO SIT STILL: NOT AT ALL
6. BECOMING EASILY ANNOYED OR IRRITABLE: SEVERAL DAYS
7. FEELING AFRAID AS IF SOMETHING AWFUL MIGHT HAPPEN: NOT AT ALL
1. FEELING NERVOUS, ANXIOUS, OR ON EDGE: SEVERAL DAYS
4. TROUBLE RELAXING: SEVERAL DAYS
2. NOT BEING ABLE TO STOP OR CONTROL WORRYING: SEVERAL DAYS
3. WORRYING TOO MUCH ABOUT DIFFERENT THINGS: SEVERAL DAYS
GAD7 TOTAL SCORE: 5

## 2025-07-25 NOTE — PATIENT INSTRUCTIONS
Current Outpatient Medications   Medication Sig        aspirin 81 mg EC tablet Take 1 tablet (81 mg) by mouth once daily. To Prevent Stroke     atorvastatin (Lipitor) 40 mg tablet Take 1 tablet (40 mg) by mouth once daily. as directed To Prevent Stroke  1    donepezil (Aricept) 10 mg tablet Take 1 tablet (10 mg) by mouth once daily at bedtime. Take 1 tablet by mouth every day in the morning for 30 days To help Memory      Farxiga 5 mg tablet TAKE 1 TABLET (5 MG) BY MOUTH ONCE DAILY. FOR DIABETES AND KIDNEY DISEASE FOR DIABETES AND KIDNEY DISEASE     levothyroxine (Synthroid, Levoxyl) 88 mcg tablet TAKE 1 TABLET BY MOUTH ONCE DAILY. For THYROID     mirtazapine (Remeron) 15 mg tablet TAKE 1 TABLET (15 MG) BY MOUTH ONCE DAILY AT BEDTIME. For SLEEP      pantoprazole (ProtoNix) 40 mg EC tablet TAKE 1 TABLET BY MOUTH EVERY DAY To prevent ULCERS      propranolol (Inderal) 10 mg tablet 1 TABLET BY MOUTH 2-3 TIMES DAILY   FOR TREMOR      spironolactone (Aldactone) 25 mg tablet TAKE 1 TABLET BY MOUTH EVERY DAY   1     No current facility-administered medications for this visit.

## 2025-07-25 NOTE — PROGRESS NOTES
Subjective   Patient ID: Lorri Velasquez is a 80 y.o. female who presents for Follow-up (Lower back still hurting, sometimes 12/10, from pinched nerve and fx vertebrae).  HPI  followup from 5/2025 visit     - seeking referral to get a steroid injection in back .    Seen at [facility] ;  states did not get along with the doctor; note describes removal from office     - ran out of Gabapentin .  Off med -  states her weird dreams stopped and her hand/ arm tremors are much better . Prefers to stay off it     -is off plavix / completed the therapy     -frustrated with multiple doctors and no consistent med list , spouse shares the names(Generic, Brand) confusing.   Theyforgot to bring the bottles iwht them . Would like a list of meds with the reason for each med.     Review of Systems  Interval : had Spine Doctor appt ,  scanned in Media    Had NICOL       Telephone Enc called  6/19 about anx, sleep,  mitrazpine trial  Telephone Enc 6.23.2025   Notified us she stopped taking the glimepiride 1 mg ,Escitalopram 20 mg  and to ask if she could just take 1/2 tab of Mirtazipine.   Pt doesn't remember why she was asking about halving the Mirtzipine . She is taking a whole tablet     Objective   BP 97/63 (BP Location: Right arm, Patient Position: Sitting, BP Cuff Size: Adult)   Pulse 79   Temp 36.3 °C (97.4 °F) (Temporal)   Resp 14   Wt 58.3 kg (128 lb 9.6 oz)   SpO2 94%   BMI 26.88 kg/m²     Physical Exam   Alert.  Upset about medications .      Assessment/Plan   Problem List Items Addressed This Visit          High    CKD (chronic kidney disease) stage 3, GFR 30-59 ml/min (Multi) (Chronic)    Hypothyroidism    Presence of Watchman left atrial appendage closure device    Primary hypertension    Type 2 diabetes mellitus with chronic kidney disease, without long-term current use of insulin (Multi)       Medium    Closed compression fracture of L2 lumbar vertebra, sequela - Primary    Relevant Orders    Referral to  Pain Medicine    Sciatica    Relevant Orders    Referral to Pain Medicine       Pain in Back,  Sciatic ,  Hx of Compression Fx.   Referral to Pain Medicine for assessment for injection .     Medication reconciliation performed,  list placed in the Patient Instructions.     Followup   3 months     FIDELIA Sullivan MD

## 2025-07-30 ENCOUNTER — OFFICE VISIT (OUTPATIENT)
Dept: PAIN MEDICINE | Facility: HOSPITAL | Age: 80
End: 2025-07-30
Payer: MEDICARE

## 2025-07-30 VITALS
OXYGEN SATURATION: 96 % | SYSTOLIC BLOOD PRESSURE: 154 MMHG | RESPIRATION RATE: 16 BRPM | DIASTOLIC BLOOD PRESSURE: 95 MMHG | BODY MASS INDEX: 26.87 KG/M2 | WEIGHT: 128 LBS | HEART RATE: 80 BPM | HEIGHT: 58 IN

## 2025-07-30 DIAGNOSIS — M48.062 SPINAL STENOSIS OF LUMBAR REGION WITH NEUROGENIC CLAUDICATION: Primary | ICD-10-CM

## 2025-07-30 PROCEDURE — 1159F MED LIST DOCD IN RCRD: CPT | Performed by: PHYSICAL MEDICINE & REHABILITATION

## 2025-07-30 PROCEDURE — 3080F DIAST BP >= 90 MM HG: CPT | Performed by: PHYSICAL MEDICINE & REHABILITATION

## 2025-07-30 PROCEDURE — 3077F SYST BP >= 140 MM HG: CPT | Performed by: PHYSICAL MEDICINE & REHABILITATION

## 2025-07-30 PROCEDURE — G2211 COMPLEX E/M VISIT ADD ON: HCPCS | Performed by: PHYSICAL MEDICINE & REHABILITATION

## 2025-07-30 PROCEDURE — 99214 OFFICE O/P EST MOD 30 MIN: CPT | Performed by: PHYSICAL MEDICINE & REHABILITATION

## 2025-07-30 PROCEDURE — 99204 OFFICE O/P NEW MOD 45 MIN: CPT | Performed by: PHYSICAL MEDICINE & REHABILITATION

## 2025-07-30 RX ORDER — DIAZEPAM 5 MG/1
5 TABLET ORAL ONCE AS NEEDED
OUTPATIENT
Start: 2025-07-30

## 2025-07-30 ASSESSMENT — ENCOUNTER SYMPTOMS
DEPRESSION: 0
OCCASIONAL FEELINGS OF UNSTEADINESS: 1
LOSS OF SENSATION IN FEET: 0

## 2025-07-30 NOTE — H&P (VIEW-ONLY)
Subjective   Patient ID: Lorri Velasquez is a 80 y.o. female who presents for Back Pain.  Lorri Velasquez is a 80 year old woman with past medical history of CKD3 and T2DM who presents today for evaluation of low back pain. She describes 5/10 low back pain at rest and 10/10 pain with ambulation. She has no pain radiating down either leg, although she has some numbness/tingling down the right leg and into the foot. No decrease in strength. She is able to walk short distances unassisted, however she uses a walker to get across the house and arrived in a wheelchair today. She has been taking 2-4 tylenol daily for pain relief. Has never had injections in the past, and describes no history of orthopedic surgeries. Was prescribed gabapentin for this back pain but stopped taking it due to hallucinations and muscle jerking. Had an MRI of the lumbar spine at [facility] one week ago.                    OARRS:  No data recorded  I have personally reviewed the OARRS report for Lorri Velasquez. I have considered the risks of abuse, dependence, addiction and diversion    Is the patient prescribed a combination of a benzodiazepine and opioid?  No    Last Urine Drug Screen / ordered today: No  No results found for this or any previous visit (from the past 8760 hours).  N/A    Review of Systems     Current Outpatient Medications   Medication Instructions    aspirin 81 mg EC tablet 1 tablet, Daily    atorvastatin (LIPITOR) 40 mg, oral, Daily, as directed    donepezil (Aricept) 10 mg tablet 1 tablet, Nightly    Farxiga 5 mg tablet TAKE 1 TABLET (5 MG) BY MOUTH ONCE DAILY. FOR DIABETES AND KIDNEY DISEASE    levothyroxine (SYNTHROID, LEVOXYL) 88 mcg, oral, Daily    mirtazapine (REMERON) 15 mg, oral, Nightly    pantoprazole (PROTONIX) 40 mg, oral, Daily    propranolol (Inderal) 10 mg tablet 1 TABLET BY MOUTH 2-3 TIMES DAILY FOR TREMOR    spironolactone (ALDACTONE) 25 mg, oral, Daily        Medical History[1]     Surgical  History[2]     Family History[3]     RX Allergies[4]     MR lumbar spine wo IV contrast 2025    Narrative  Patient Name: TABITHA STEARNS  : 1945  MRN: [id number]  [id number]  Accession: [id number]  Exam Date/Time: 2025 11:02  Procedure: MR LUMBAR SPINE WO CONTRAST  Ordering Provider: HENRY ERIC  Reason For Exam: Lumbar Radiculpothy right, other fracture second of lumbar vertebra      MR LUMBAR SPINE WO CONTRAST    CLINICAL HISTORY: Lumbar Radiculopathy right, other fracture second of lumbar vertebra    TECHNIQUE: Multiplanar, multisequence MRI imaging of the lumbar spine without contrast.    COMPARISON: None    FINDINGS:    Counting reference: For this report, L5-S1 is considered the last lumbar-type disc space, and L4-L5 is at the level of the iliac crests.    Alignment: There is grade 1 anterolisthesis of L4 on L5 and L5 on S1    Conus: Normal position and signal intensity.    Vertebral bodies/marrow: There is approximately 50 percent loss of height involving the L2 vertebral body with associated hyperintense signal on STIR images. No significant retropulsion is noted. Normal marrow signal intensity.    Canal and foramina:  T11-T12: Right central disc protrusion is noted. There is mild canal stenosis asymmetric to the right. Neural foramen are patent.    T12-L1: Canal and foramina are patent.    L1-L2: There is left subarticular and left foraminal disc protrusion. There is facet hypertrophy. There is mild left-sided neural foraminal narrowing    L2-L3: There is mild disc bulging. Canal and foramina are patent    L3-L4: Normal morphology and disc space narrowing with mild disc bulging is noted. There is facet hypertrophy. There is moderate bilateral neural foraminal narrowing    L4-L5: Anterolisthesis with associated pseudo bulging and facet hypertrophy is noted. There is severe bilateral neural foraminal narrowing with flattening the exiting bilateral L4 nerve roots.    L5-S1:  Anterolisthesis is noted. Disc bulging and facet hypertrophy is noted. There is severe bilateral neural foraminal narrowing with flattening the bilateral exiting L5 nerve roots    Visualized sacrum: Unremarkable.    Soft tissue structures: Unremarkable.    Impression  1.  Acute fracture of the L2 vertebral body with approximately 50 percent loss of height without associated retropulsion.    2.  Lumbar spondylosis most pronounced at the L4-L5 and L5-S1 levels.    Report Dictated on Workstation: [id number]  Electronically Signed By: Tony Landry MD  Electronically Signed Date/Time: 7/23/2025 2:02 PM EDT      Objective     Vitals:    07/30/25 1010   BP: (!) 154/95   Pulse: 80   Resp: 16   SpO2: 96%               Physical Exam    GENERAL EXAM  Vital Signs: Vital signs to include heart rate, respiration rate, blood pressure, and temperature were reviewed.  General Appearance:  Awake, alert, healthy appearing, well developed, No acute distress.  Head: Normocephalic without evidence of head injury.  Neck: The appearance of the neck was normal without swelling with a midline trachea.  Eyes: The eyelids and eyebrows exhibited no abnormalities.  Pupils were not pin-point.  Sclera was without icterus.  Lungs: Respiration rhythm and depth was normal.  Respiratory movements were normal without labored breathing.  Cardiovascular: No peripheral edema was present.    Neurological: Patient was oriented to time, place, and person.  Speech was normal.  Balance, gait, and stance were unremarkable.    Psychiatric: Appearance was normal with appropriate dress.  Mood was euthymic and affect was normal.  Skin: Affected regions were without ecchymosis or skin lesions.    Back (MSK/neuro/skin):  Full active and passive range of motion in all planes  Strength 5 out of 5 bilateral lower extremities  Sensation to light-touch grossly intact bilateral lower extremities  Deep tendon reflexes equal bilateral lower extremities  No  edema/effusion/erythema  Skin: no skin lesions or scars  B SLR (-)  B sacral spring test (-),   B facet load (-)  B SIJ tenderness (-)   B KARLA (-)  Full flexion/extension  No TTP, no muscle spasm over lumbar paraspinals    Physical exam as above except:  Straight leg test negative bilaterally  Karla test positive on right, negative on left  - Tenderness to palpation          Assessment/Plan   Diagnoses and all orders for this visit:  Spinal stenosis of lumbar region with neurogenic claudication  -     FL pain management; Future  -     Epidural Steroid Injection; Future  Other orders  -     Referral to Pain Medicine  -     NPO Diet Except: Sips with meds; Effective now; Standing  -     Height and weight; Standing  -     Insert and maintain peripheral IV; Standing  -     Saline lock IV; Standing  -     POCT Glucose; Standing  -     diazePAM (Valium) tablet 5 mg  -     Adult diet Regular; Standing  -     Vital Signs; Standing  -     Neuro checks; Standing  -     Notify physician - Standard Parameters; Standing  -     Continue IV fluids ordered pre-procedure; Standing  -     Prior to Discharge O2 Weaning; Standing  -     Pulse oximetry, continuous; Standing  -     Discharge patient; Standing    Lorri Velasquez is a 80yoF with PMHx of CKD3 and T2DM who presented today for evaluation of low back pain. She has no pain down the legs, however she has numbness in the right thigh. MRI lumbar spine from a week ago showed severe bilateral L4-L5 neural foraminal narrowing, with her right sided back pain and radicular symptoms I believe she would benefit from an epidural steroid injection in this area.         This note was generated with the aid of dictation software, there may be typos despite my attempts at proofreading.     Yoan Esparza MD  Anesthesiology PGY1      I saw and evaluated the patient. I personally obtained the key and critical portions of the history and physical exam or was physically present for key and  critical portions performed by the resident/fellow. I reviewed the resident/fellow's documentation and discussed the patient with the resident/fellow. I agree with the resident/fellow's medical decision making as documented in the note with the exception/addition of the followin-year-old female with lower back pain with claudication symptoms with difficulty walking positive shopping cart sign.    Medical necessity: The patient is presenting primarily with Lumbar pain and claudication symptoms.  The pain is constant and of moderate severity that interferes with activities of daily living and sleep. The patient failed conservative therapy to include Tylenol/NSAIDS and physical therapy/supervised home exercise program.  Lumbar spine MRI which I personally reviewed showed degenerative changes with central canal stenosis as well as foraminal stenosis especially at L4-5.  Plan is to proceed with Lumbar Intralaminar Epidural Steroid Injection at L4-5 with fluoroscopy.  We discussed the risks, benefits and alternatives to the procedure(s) and the patient would like to proceed.  This procedure is part of a comprehensive and multimodal treatment plan to facility physical therapy and a supervised home exercise program.    Plan:  - L4-5 interlaminar epidural steroid injection as above.  - Continue with home exercise program.  - Patient was previously in gabapentin which she did not tolerate, we did discuss potentially adding duloxetine however patient would like to hold off for now given the side effects that she had from gabapentin.  - She will follow-up with my nurse practitioner, we may consider bilateral L4 transforaminal epidural steroid injection the future should the interlaminar not help with her pain.      Mark Storm MD        [1]   Past Medical History:  Diagnosis Date    Atrial fibrillation (Multi) 2019    Calcific Achilles tendinitis 2023    Encounter for immunization 10/25/2016     Immunization due    Gall stones 2023    Other specified abnormal findings of blood chemistry 2017    Elevated serum creatinine    Other specified disorders of teeth and supporting structures 10/20/2015    Tooth pain    Periapical abscess without sinus 2016    Tooth infection    Personal history of other diseases of the musculoskeletal system and connective tissue 10/06/2014    History of muscle pain    Personal history of other specified conditions 10/25/2016    History of dysuria    Personal history of other specified conditions 2014    History of dyspnea    Personal history of urinary (tract) infections 2017    History of urinary tract infection    Proteinuria, unspecified 2017    Proteinuria    Pyuria 2016    Pyuria    Rectus diastasis 2023   [2]   Past Surgical History:  Procedure Laterality Date    BLADDER SURGERY  2014    Bladder Surgery     SECTION, CLASSIC  2014     Section    COLONOSCOPY  2014    Complete Colonoscopy    HYSTERECTOMY  2014    Hysterectomy    OTHER SURGICAL HISTORY  2014    Wrist Surgery   [3]   Family History  Problem Relation Name Age of Onset    Alzheimer's disease Mother      Heart attack Father      No Known Problems Sister      No Known Problems Brother     [4]   Allergies  Allergen Reactions    Diazepam Other    Gabapentin Other and Hallucinations     Tremors increased    Duloxetine Anxiety    Duloxetine Hcl Anxiety    Levofloxacin Rash

## 2025-07-30 NOTE — PROGRESS NOTES
Subjective   Patient ID: Lorri Velasquez is a 80 y.o. female who presents for Back Pain.  Lorri Velasquez is a 80 year old woman with past medical history of CKD3 and T2DM who presents today for evaluation of low back pain. She describes 5/10 low back pain at rest and 10/10 pain with ambulation. She has no pain radiating down either leg, although she has some numbness/tingling down the right leg and into the foot. No decrease in strength. She is able to walk short distances unassisted, however she uses a walker to get across the house and arrived in a wheelchair today. She has been taking 2-4 tylenol daily for pain relief. Has never had injections in the past, and describes no history of orthopedic surgeries. Was prescribed gabapentin for this back pain but stopped taking it due to hallucinations and muscle jerking. Had an MRI of the lumbar spine at [facility] one week ago.                    OARRS:  No data recorded  I have personally reviewed the OARRS report for Lorri Velasquez. I have considered the risks of abuse, dependence, addiction and diversion    Is the patient prescribed a combination of a benzodiazepine and opioid?  No    Last Urine Drug Screen / ordered today: No  No results found for this or any previous visit (from the past 8760 hours).  N/A    Review of Systems     Current Outpatient Medications   Medication Instructions    aspirin 81 mg EC tablet 1 tablet, Daily    atorvastatin (LIPITOR) 40 mg, oral, Daily, as directed    donepezil (Aricept) 10 mg tablet 1 tablet, Nightly    Farxiga 5 mg tablet TAKE 1 TABLET (5 MG) BY MOUTH ONCE DAILY. FOR DIABETES AND KIDNEY DISEASE    levothyroxine (SYNTHROID, LEVOXYL) 88 mcg, oral, Daily    mirtazapine (REMERON) 15 mg, oral, Nightly    pantoprazole (PROTONIX) 40 mg, oral, Daily    propranolol (Inderal) 10 mg tablet 1 TABLET BY MOUTH 2-3 TIMES DAILY FOR TREMOR    spironolactone (ALDACTONE) 25 mg, oral, Daily        Medical History[1]     Surgical  History[2]     Family History[3]     RX Allergies[4]     MR lumbar spine wo IV contrast 2025    Narrative  Patient Name: TABITHA STEARNS  : 1945  MRN: [id number]  [id number]  Accession: [id number]  Exam Date/Time: 2025 11:02  Procedure: MR LUMBAR SPINE WO CONTRAST  Ordering Provider: HENRY ERIC  Reason For Exam: Lumbar Radiculpothy right, other fracture second of lumbar vertebra      MR LUMBAR SPINE WO CONTRAST    CLINICAL HISTORY: Lumbar Radiculopathy right, other fracture second of lumbar vertebra    TECHNIQUE: Multiplanar, multisequence MRI imaging of the lumbar spine without contrast.    COMPARISON: None    FINDINGS:    Counting reference: For this report, L5-S1 is considered the last lumbar-type disc space, and L4-L5 is at the level of the iliac crests.    Alignment: There is grade 1 anterolisthesis of L4 on L5 and L5 on S1    Conus: Normal position and signal intensity.    Vertebral bodies/marrow: There is approximately 50 percent loss of height involving the L2 vertebral body with associated hyperintense signal on STIR images. No significant retropulsion is noted. Normal marrow signal intensity.    Canal and foramina:  T11-T12: Right central disc protrusion is noted. There is mild canal stenosis asymmetric to the right. Neural foramen are patent.    T12-L1: Canal and foramina are patent.    L1-L2: There is left subarticular and left foraminal disc protrusion. There is facet hypertrophy. There is mild left-sided neural foraminal narrowing    L2-L3: There is mild disc bulging. Canal and foramina are patent    L3-L4: Normal morphology and disc space narrowing with mild disc bulging is noted. There is facet hypertrophy. There is moderate bilateral neural foraminal narrowing    L4-L5: Anterolisthesis with associated pseudo bulging and facet hypertrophy is noted. There is severe bilateral neural foraminal narrowing with flattening the exiting bilateral L4 nerve roots.    L5-S1:  Anterolisthesis is noted. Disc bulging and facet hypertrophy is noted. There is severe bilateral neural foraminal narrowing with flattening the bilateral exiting L5 nerve roots    Visualized sacrum: Unremarkable.    Soft tissue structures: Unremarkable.    Impression  1.  Acute fracture of the L2 vertebral body with approximately 50 percent loss of height without associated retropulsion.    2.  Lumbar spondylosis most pronounced at the L4-L5 and L5-S1 levels.    Report Dictated on Workstation: [id number]  Electronically Signed By: Tony Landry MD  Electronically Signed Date/Time: 7/23/2025 2:02 PM EDT      Objective     Vitals:    07/30/25 1010   BP: (!) 154/95   Pulse: 80   Resp: 16   SpO2: 96%               Physical Exam    GENERAL EXAM  Vital Signs: Vital signs to include heart rate, respiration rate, blood pressure, and temperature were reviewed.  General Appearance:  Awake, alert, healthy appearing, well developed, No acute distress.  Head: Normocephalic without evidence of head injury.  Neck: The appearance of the neck was normal without swelling with a midline trachea.  Eyes: The eyelids and eyebrows exhibited no abnormalities.  Pupils were not pin-point.  Sclera was without icterus.  Lungs: Respiration rhythm and depth was normal.  Respiratory movements were normal without labored breathing.  Cardiovascular: No peripheral edema was present.    Neurological: Patient was oriented to time, place, and person.  Speech was normal.  Balance, gait, and stance were unremarkable.    Psychiatric: Appearance was normal with appropriate dress.  Mood was euthymic and affect was normal.  Skin: Affected regions were without ecchymosis or skin lesions.    Back (MSK/neuro/skin):  Full active and passive range of motion in all planes  Strength 5 out of 5 bilateral lower extremities  Sensation to light-touch grossly intact bilateral lower extremities  Deep tendon reflexes equal bilateral lower extremities  No  edema/effusion/erythema  Skin: no skin lesions or scars  B SLR (-)  B sacral spring test (-),   B facet load (-)  B SIJ tenderness (-)   B KARLA (-)  Full flexion/extension  No TTP, no muscle spasm over lumbar paraspinals    Physical exam as above except:  Straight leg test negative bilaterally  Karla test positive on right, negative on left  - Tenderness to palpation          Assessment/Plan   Diagnoses and all orders for this visit:  Spinal stenosis of lumbar region with neurogenic claudication  -     FL pain management; Future  -     Epidural Steroid Injection; Future  Other orders  -     Referral to Pain Medicine  -     NPO Diet Except: Sips with meds; Effective now; Standing  -     Height and weight; Standing  -     Insert and maintain peripheral IV; Standing  -     Saline lock IV; Standing  -     POCT Glucose; Standing  -     diazePAM (Valium) tablet 5 mg  -     Adult diet Regular; Standing  -     Vital Signs; Standing  -     Neuro checks; Standing  -     Notify physician - Standard Parameters; Standing  -     Continue IV fluids ordered pre-procedure; Standing  -     Prior to Discharge O2 Weaning; Standing  -     Pulse oximetry, continuous; Standing  -     Discharge patient; Standing    Lorri Velasquez is a 80yoF with PMHx of CKD3 and T2DM who presented today for evaluation of low back pain. She has no pain down the legs, however she has numbness in the right thigh. MRI lumbar spine from a week ago showed severe bilateral L4-L5 neural foraminal narrowing, with her right sided back pain and radicular symptoms I believe she would benefit from an epidural steroid injection in this area.         This note was generated with the aid of dictation software, there may be typos despite my attempts at proofreading.     Yoan Esparza MD  Anesthesiology PGY1      I saw and evaluated the patient. I personally obtained the key and critical portions of the history and physical exam or was physically present for key and  critical portions performed by the resident/fellow. I reviewed the resident/fellow's documentation and discussed the patient with the resident/fellow. I agree with the resident/fellow's medical decision making as documented in the note with the exception/addition of the followin-year-old female with lower back pain with claudication symptoms with difficulty walking positive shopping cart sign.    Medical necessity: The patient is presenting primarily with Lumbar pain and claudication symptoms.  The pain is constant and of moderate severity that interferes with activities of daily living and sleep. The patient failed conservative therapy to include Tylenol/NSAIDS and physical therapy/supervised home exercise program.  Lumbar spine MRI which I personally reviewed showed degenerative changes with central canal stenosis as well as foraminal stenosis especially at L4-5.  Plan is to proceed with Lumbar Intralaminar Epidural Steroid Injection at L4-5 with fluoroscopy.  We discussed the risks, benefits and alternatives to the procedure(s) and the patient would like to proceed.  This procedure is part of a comprehensive and multimodal treatment plan to facility physical therapy and a supervised home exercise program.    Plan:  - L4-5 interlaminar epidural steroid injection as above.  - Continue with home exercise program.  - Patient was previously in gabapentin which she did not tolerate, we did discuss potentially adding duloxetine however patient would like to hold off for now given the side effects that she had from gabapentin.  - She will follow-up with my nurse practitioner, we may consider bilateral L4 transforaminal epidural steroid injection the future should the interlaminar not help with her pain.      Mark Storm MD        [1]   Past Medical History:  Diagnosis Date    Atrial fibrillation (Multi) 2019    Calcific Achilles tendinitis 2023    Encounter for immunization 10/25/2016     Immunization due    Gall stones 2023    Other specified abnormal findings of blood chemistry 2017    Elevated serum creatinine    Other specified disorders of teeth and supporting structures 10/20/2015    Tooth pain    Periapical abscess without sinus 2016    Tooth infection    Personal history of other diseases of the musculoskeletal system and connective tissue 10/06/2014    History of muscle pain    Personal history of other specified conditions 10/25/2016    History of dysuria    Personal history of other specified conditions 2014    History of dyspnea    Personal history of urinary (tract) infections 2017    History of urinary tract infection    Proteinuria, unspecified 2017    Proteinuria    Pyuria 2016    Pyuria    Rectus diastasis 2023   [2]   Past Surgical History:  Procedure Laterality Date    BLADDER SURGERY  2014    Bladder Surgery     SECTION, CLASSIC  2014     Section    COLONOSCOPY  2014    Complete Colonoscopy    HYSTERECTOMY  2014    Hysterectomy    OTHER SURGICAL HISTORY  2014    Wrist Surgery   [3]   Family History  Problem Relation Name Age of Onset    Alzheimer's disease Mother      Heart attack Father      No Known Problems Sister      No Known Problems Brother     [4]   Allergies  Allergen Reactions    Diazepam Other    Gabapentin Other and Hallucinations     Tremors increased    Duloxetine Anxiety    Duloxetine Hcl Anxiety    Levofloxacin Rash

## 2025-08-08 ENCOUNTER — HOSPITAL ENCOUNTER (OUTPATIENT)
Dept: GASTROENTEROLOGY | Facility: HOSPITAL | Age: 80
Discharge: HOME | End: 2025-08-08
Payer: MEDICARE

## 2025-08-08 VITALS
HEIGHT: 58 IN | SYSTOLIC BLOOD PRESSURE: 146 MMHG | OXYGEN SATURATION: 94 % | RESPIRATION RATE: 18 BRPM | WEIGHT: 128 LBS | DIASTOLIC BLOOD PRESSURE: 92 MMHG | HEART RATE: 82 BPM | BODY MASS INDEX: 26.87 KG/M2 | TEMPERATURE: 97 F

## 2025-08-08 DIAGNOSIS — M48.062 SPINAL STENOSIS OF LUMBAR REGION WITH NEUROGENIC CLAUDICATION: ICD-10-CM

## 2025-08-08 PROCEDURE — 2550000001 HC RX 255 CONTRASTS: Performed by: PHYSICAL MEDICINE & REHABILITATION

## 2025-08-08 PROCEDURE — 2500000004 HC RX 250 GENERAL PHARMACY W/ HCPCS (ALT 636 FOR OP/ED): Performed by: PHYSICAL MEDICINE & REHABILITATION

## 2025-08-08 PROCEDURE — 7100000010 HC PHASE TWO TIME - EACH INCREMENTAL 1 MINUTE

## 2025-08-08 PROCEDURE — 7100000009 HC PHASE TWO TIME - INITIAL BASE CHARGE

## 2025-08-08 PROCEDURE — 62323 NJX INTERLAMINAR LMBR/SAC: CPT | Performed by: PHYSICAL MEDICINE & REHABILITATION

## 2025-08-08 RX ORDER — METHYLPREDNISOLONE ACETATE 40 MG/ML
INJECTION, SUSPENSION INTRA-ARTICULAR; INTRALESIONAL; INTRAMUSCULAR; SOFT TISSUE AS NEEDED
Status: COMPLETED | OUTPATIENT
Start: 2025-08-08 | End: 2025-08-08

## 2025-08-08 RX ORDER — LIDOCAINE HYDROCHLORIDE 5 MG/ML
INJECTION, SOLUTION INFILTRATION; INTRAVENOUS AS NEEDED
Status: COMPLETED | OUTPATIENT
Start: 2025-08-08 | End: 2025-08-08

## 2025-08-08 RX ADMIN — IOHEXOL 5 ML: 350 INJECTION, SOLUTION INTRAVENOUS at 11:52

## 2025-08-08 RX ADMIN — METHYLPREDNISOLONE ACETATE 40 MG: 40 INJECTION, SUSPENSION INTRA-ARTICULAR; INTRALESIONAL; INTRAMUSCULAR; INTRASYNOVIAL; SOFT TISSUE at 11:52

## 2025-08-08 RX ADMIN — LIDOCAINE HYDROCHLORIDE 15 ML: 5 INJECTION, SOLUTION INFILTRATION at 11:51

## 2025-08-08 ASSESSMENT — COLUMBIA-SUICIDE SEVERITY RATING SCALE - C-SSRS
6. HAVE YOU EVER DONE ANYTHING, STARTED TO DO ANYTHING, OR PREPARED TO DO ANYTHING TO END YOUR LIFE?: NO
1. IN THE PAST MONTH, HAVE YOU WISHED YOU WERE DEAD OR WISHED YOU COULD GO TO SLEEP AND NOT WAKE UP?: NO
2. HAVE YOU ACTUALLY HAD ANY THOUGHTS OF KILLING YOURSELF?: NO

## 2025-08-08 ASSESSMENT — PAIN - FUNCTIONAL ASSESSMENT
PAIN_FUNCTIONAL_ASSESSMENT: 0-10

## 2025-08-08 ASSESSMENT — PAIN SCALES - GENERAL
PAINLEVEL_OUTOF10: 0 - NO PAIN
PAINLEVEL_OUTOF10: 5 - MODERATE PAIN
PAINLEVEL_OUTOF10: 0 - NO PAIN

## 2025-08-08 NOTE — DISCHARGE INSTRUCTIONS
DISCHARGE INSTRUCTIONS FOR INJECTIONS     After most injections, it is recommended that you relax and limit your activity for the remainder of the day unless you have been told otherwise by your pain physician.  You should not drive a car, operate machinery, or make important legal decisions unless otherwise directed by your pain physician.  You may resume your normal activity, including exercise, tomorrow.      Keep a written pain diary of how much pain relief you experienced following the injection procedure and the length of time of pain relief you experienced pain relief. Following diagnostic injections like medial branch nerve blocks, genicular nerve blocks, sacroiliac joint blocks, stellate ganglion injections and other blocks, it is very important you record the specific amount of pain relief you experienced immediately after the injection and how long it lasted. If you have been given Questionnaire paperwork to fill out out after your diagnostic block please call [phone] and leave a detailed voicemail with the answers to the questions you were given. This information is vital to getting approval for next steps.    Observe the needle site for excessive bleeding (slow general oozing that completely soaks the dressing or fresh bright red bleeding).  In either case, apply pressure to the area, elevate it if possible and call your doctor at once.      For all injections, please keep the injection site dry and inspect the site for a couple of days. You may remove the Band-Aid the day of the injection at any time.     Keep the needle site clean & dry for 24 hours.   no soaking baths, hot tubs, whirlpools or swimming pools for 2-3 days.     Some discomfort, bruising or slight swelling may occur at the injection site. This is not abnormal if it occurs.  If needed you may:    -Take over the counter medication such as Tylenol or Motrin.   -Apply an ice pack for 30 minutes, 2 to 3 times a day for the first 24  hours.     If you are given steroids in your injection, your pain may not be gone immediately after this procedure. It generally takes 3-5 days for the steroid to work but it can take up to 2 weeks. may You may notice a worsening of your symptoms for 1-2 days after the injection. This is not abnormal.  You may use acetaminophen, ibuprofen, or prescription medication that your doctor may have prescribed for you if you need to do so.     A few common side effects of steroids include facial flushing, sweating, restlessness, irritability,difficulty sleeping, increase in blood sugar, and increased blood pressure. If you have diabetes, please monitor your blood sugar at least once a day for at least 5 days. If you have poorly controlled high blood pressure, monitoryour blood pressure for at least 2 days and contact your primary care physician if these numbers are unusually high for you.        Call  Pain Management at [phone] between 8am-4pm Monday - Friday if you are experiencing the following:    If you received an epidural or spinal injection:    -Headache that does not go away with medicine, is worse when sitting or standing up, and is greatly relieved upon lying down.   -Severe pain worse than or different than your baseline pain.   -Chills or fever (101º F or greater).   -Drainage or signs of infection at the injection site     Go directly to the Emergency Department if you are experiencing the following and received an epidural or spinal injection:   -Abrupt weakness or progressive weakness in your legs that starts after you leave the clinic.   -Abrupt severe or worsening numbness in your legs.   -Inability to urinate after the injection or loss of bowel or bladder control without the urge to defecate or urinate.     If you have a clinical question that cannot wait until your next appointment, please call [phone] between 8am-4pm Monday - Friday. We do our best to return all non-emergency messages within  24-48 hours, Monday - Friday. A nurse or physician will return your message. You may also try calling and they will do their best to answer your question(s):    Loreta Storm's nurse [phone]  [facility] Pain Management nurse [phone]  After hours pain management [phone]    If you need to cancel an appointment, please call the scheduling staff at [phone] during normal business hours or leave a message at least 24 hours in advance.

## 2025-08-15 ENCOUNTER — APPOINTMENT (OUTPATIENT)
Dept: GASTROENTEROLOGY | Facility: HOSPITAL | Age: 80
End: 2025-08-15
Payer: MEDICARE

## 2025-08-18 ENCOUNTER — TELEPHONE (OUTPATIENT)
Dept: PAIN MEDICINE | Facility: HOSPITAL | Age: 80
End: 2025-08-18
Payer: MEDICARE

## 2025-08-19 ENCOUNTER — APPOINTMENT (OUTPATIENT)
Dept: PRIMARY CARE | Facility: CLINIC | Age: 80
End: 2025-08-19
Payer: MEDICARE

## 2025-08-25 ENCOUNTER — TELEPHONE (OUTPATIENT)
Dept: PAIN MEDICINE | Facility: HOSPITAL | Age: 80
End: 2025-08-25
Payer: MEDICARE

## 2025-08-26 ENCOUNTER — OFFICE VISIT (OUTPATIENT)
Dept: PAIN MEDICINE | Facility: HOSPITAL | Age: 80
End: 2025-08-26
Payer: MEDICARE

## 2025-08-26 VITALS
HEART RATE: 88 BPM | DIASTOLIC BLOOD PRESSURE: 67 MMHG | RESPIRATION RATE: 16 BRPM | WEIGHT: 128 LBS | BODY MASS INDEX: 26.87 KG/M2 | OXYGEN SATURATION: 95 % | SYSTOLIC BLOOD PRESSURE: 115 MMHG | HEIGHT: 58 IN

## 2025-08-26 DIAGNOSIS — M62.838 MUSCLE SPASM: Primary | ICD-10-CM

## 2025-08-26 DIAGNOSIS — M48.062 SPINAL STENOSIS OF LUMBAR REGION WITH NEUROGENIC CLAUDICATION: ICD-10-CM

## 2025-08-26 PROCEDURE — 3074F SYST BP LT 130 MM HG: CPT | Performed by: CLINICAL NURSE SPECIALIST

## 2025-08-26 PROCEDURE — 3078F DIAST BP <80 MM HG: CPT | Performed by: CLINICAL NURSE SPECIALIST

## 2025-08-26 PROCEDURE — 1160F RVW MEDS BY RX/DR IN RCRD: CPT | Performed by: CLINICAL NURSE SPECIALIST

## 2025-08-26 PROCEDURE — 99214 OFFICE O/P EST MOD 30 MIN: CPT | Performed by: CLINICAL NURSE SPECIALIST

## 2025-08-26 PROCEDURE — G2211 COMPLEX E/M VISIT ADD ON: HCPCS | Performed by: CLINICAL NURSE SPECIALIST

## 2025-08-26 PROCEDURE — 99213 OFFICE O/P EST LOW 20 MIN: CPT | Performed by: CLINICAL NURSE SPECIALIST

## 2025-08-26 PROCEDURE — 1159F MED LIST DOCD IN RCRD: CPT | Performed by: CLINICAL NURSE SPECIALIST

## 2025-08-26 RX ORDER — METHOCARBAMOL 500 MG/1
250 TABLET, FILM COATED ORAL NIGHTLY PRN
Qty: 15 TABLET | Refills: 2 | Status: SHIPPED | OUTPATIENT
Start: 2025-08-26 | End: 2025-09-25

## 2025-08-26 ASSESSMENT — ENCOUNTER SYMPTOMS
DEPRESSION: 0
LOSS OF SENSATION IN FEET: 0
OCCASIONAL FEELINGS OF UNSTEADINESS: 1

## 2025-08-28 ENCOUNTER — HOSPITAL ENCOUNTER (OUTPATIENT)
Dept: RADIOLOGY | Facility: HOSPITAL | Age: 80
Discharge: HOME | End: 2025-08-28
Payer: MEDICARE

## 2025-08-28 ENCOUNTER — OFFICE VISIT (OUTPATIENT)
Dept: PULMONOLOGY | Facility: HOSPITAL | Age: 80
End: 2025-08-28
Payer: MEDICARE

## 2025-08-28 VITALS
BODY MASS INDEX: 27.98 KG/M2 | HEIGHT: 57 IN | RESPIRATION RATE: 18 BRPM | HEART RATE: 76 BPM | TEMPERATURE: 97.4 F | SYSTOLIC BLOOD PRESSURE: 126 MMHG | WEIGHT: 129.7 LBS | OXYGEN SATURATION: 95 % | DIASTOLIC BLOOD PRESSURE: 79 MMHG

## 2025-08-28 DIAGNOSIS — K21.9 GASTROESOPHAGEAL REFLUX DISEASE WITHOUT ESOPHAGITIS: ICD-10-CM

## 2025-08-28 DIAGNOSIS — J30.2 PERENNIAL ALLERGIC RHINITIS WITH SEASONAL VARIATION: ICD-10-CM

## 2025-08-28 DIAGNOSIS — J30.89 PERENNIAL ALLERGIC RHINITIS WITH SEASONAL VARIATION: ICD-10-CM

## 2025-08-28 DIAGNOSIS — J98.6 ELEVATED DIAPHRAGM: ICD-10-CM

## 2025-08-28 DIAGNOSIS — R05.3 CHRONIC COUGH: ICD-10-CM

## 2025-08-28 DIAGNOSIS — R05.3 CHRONIC COUGH: Primary | ICD-10-CM

## 2025-08-28 DIAGNOSIS — I25.10 ARTERIOSCLEROSIS OF CORONARY ARTERY: ICD-10-CM

## 2025-08-28 PROCEDURE — 1036F TOBACCO NON-USER: CPT | Performed by: INTERNAL MEDICINE

## 2025-08-28 PROCEDURE — 1159F MED LIST DOCD IN RCRD: CPT | Performed by: INTERNAL MEDICINE

## 2025-08-28 PROCEDURE — 99212 OFFICE O/P EST SF 10 MIN: CPT | Mod: 25

## 2025-08-28 PROCEDURE — 3078F DIAST BP <80 MM HG: CPT | Performed by: INTERNAL MEDICINE

## 2025-08-28 PROCEDURE — 3074F SYST BP LT 130 MM HG: CPT | Performed by: INTERNAL MEDICINE

## 2025-08-28 PROCEDURE — 99214 OFFICE O/P EST MOD 30 MIN: CPT | Performed by: INTERNAL MEDICINE

## 2025-08-28 PROCEDURE — 71046 X-RAY EXAM CHEST 2 VIEWS: CPT

## 2025-08-28 RX ORDER — FLUTICASONE PROPIONATE 50 MCG
2 SPRAY, SUSPENSION (ML) NASAL DAILY
Qty: 16 G | Refills: 6 | Status: SHIPPED | OUTPATIENT
Start: 2025-08-28 | End: 2026-02-24

## 2025-08-28 RX ORDER — FAMOTIDINE 20 MG/1
10 TABLET, FILM COATED ORAL NIGHTLY
Qty: 15 TABLET | Refills: 3 | Status: SHIPPED | OUTPATIENT
Start: 2025-08-28 | End: 2026-08-28

## 2025-08-28 RX ORDER — FLUTICASONE FUROATE AND VILANTEROL 200; 25 UG/1; UG/1
1 POWDER RESPIRATORY (INHALATION) DAILY
Qty: 1 EACH | Refills: 11 | Status: SHIPPED | OUTPATIENT
Start: 2025-08-28

## 2025-08-28 RX ORDER — CETIRIZINE HYDROCHLORIDE 5 MG/1
5 TABLET ORAL DAILY
Qty: 30 TABLET | Refills: 11 | Status: SHIPPED | OUTPATIENT
Start: 2025-08-28 | End: 2026-08-28

## 2025-08-28 ASSESSMENT — ENCOUNTER SYMPTOMS
COUGH: 0
LOSS OF SENSATION IN FEET: 0
DEPRESSION: 0
RHINORRHEA: 0
UNEXPECTED WEIGHT CHANGE: 0
SHORTNESS OF BREATH: 1
OCCASIONAL FEELINGS OF UNSTEADINESS: 0
FEVER: 0
FATIGUE: 0
CHILLS: 0
WHEEZING: 0

## 2025-08-28 ASSESSMENT — COLUMBIA-SUICIDE SEVERITY RATING SCALE - C-SSRS
1. IN THE PAST MONTH, HAVE YOU WISHED YOU WERE DEAD OR WISHED YOU COULD GO TO SLEEP AND NOT WAKE UP?: NO
2. HAVE YOU ACTUALLY HAD ANY THOUGHTS OF KILLING YOURSELF?: NO
6. HAVE YOU EVER DONE ANYTHING, STARTED TO DO ANYTHING, OR PREPARED TO DO ANYTHING TO END YOUR LIFE?: NO

## 2025-09-12 ENCOUNTER — APPOINTMENT (OUTPATIENT)
Dept: PRIMARY CARE | Facility: CLINIC | Age: 80
End: 2025-09-12
Payer: MEDICARE

## 2025-09-29 ENCOUNTER — APPOINTMENT (OUTPATIENT)
Dept: PAIN MEDICINE | Facility: HOSPITAL | Age: 80
End: 2025-09-29
Payer: MEDICARE

## 2025-10-06 ENCOUNTER — APPOINTMENT (OUTPATIENT)
Dept: PAIN MEDICINE | Facility: HOSPITAL | Age: 80
End: 2025-10-06
Payer: MEDICARE

## 2025-10-09 ENCOUNTER — APPOINTMENT (OUTPATIENT)
Dept: PULMONOLOGY | Facility: HOSPITAL | Age: 80
End: 2025-10-09
Payer: MEDICARE

## 2025-10-29 ENCOUNTER — APPOINTMENT (OUTPATIENT)
Dept: PRIMARY CARE | Facility: CLINIC | Age: 80
End: 2025-10-29
Payer: MEDICARE